# Patient Record
Sex: FEMALE | Race: WHITE | NOT HISPANIC OR LATINO | Employment: UNEMPLOYED | URBAN - NONMETROPOLITAN AREA
[De-identification: names, ages, dates, MRNs, and addresses within clinical notes are randomized per-mention and may not be internally consistent; named-entity substitution may affect disease eponyms.]

---

## 2017-08-14 ENCOUNTER — HOSPITAL ENCOUNTER (OUTPATIENT)
Facility: HOSPITAL | Age: 57
Setting detail: OBSERVATION
LOS: 1 days | Discharge: HOME | End: 2017-08-17
Attending: INTERNAL MEDICINE | Admitting: INTERNAL MEDICINE

## 2017-08-14 ENCOUNTER — PREP FOR SURGERY (OUTPATIENT)
Dept: OTHER | Facility: HOSPITAL | Age: 57
End: 2017-08-14

## 2017-08-14 DIAGNOSIS — L03.114 CELLULITIS OF ARM, LEFT: Primary | ICD-10-CM

## 2017-08-14 DIAGNOSIS — L02.419 AXILLARY ABSCESS: ICD-10-CM

## 2017-08-14 LAB
ANION GAP SERPL CALCULATED.3IONS-SCNC: 2.6 MMOL/L (ref 3.6–11.2)
BASOPHILS # BLD AUTO: 0.05 10*3/MM3 (ref 0–0.3)
BASOPHILS NFR BLD AUTO: 0.5 % (ref 0–2)
BUN BLD-MCNC: 20 MG/DL (ref 7–21)
BUN/CREAT SERPL: 35.1 (ref 7–25)
CALCIUM SPEC-SCNC: 9 MG/DL (ref 7.7–10)
CHLORIDE SERPL-SCNC: 105 MMOL/L (ref 99–112)
CO2 SERPL-SCNC: 30.4 MMOL/L (ref 24.3–31.9)
CREAT BLD-MCNC: 0.57 MG/DL (ref 0.43–1.29)
CRP SERPL-MCNC: 8.06 MG/DL (ref 0–0.99)
DEPRECATED RDW RBC AUTO: 40.7 FL (ref 37–54)
EOSINOPHIL # BLD AUTO: 0.22 10*3/MM3 (ref 0–0.7)
EOSINOPHIL NFR BLD AUTO: 2.4 % (ref 0–5)
ERYTHROCYTE [DISTWIDTH] IN BLOOD BY AUTOMATED COUNT: 13.1 % (ref 11.5–14.5)
GFR SERPL CREATININE-BSD FRML MDRD: 109 ML/MIN/1.73
GLUCOSE BLD-MCNC: 129 MG/DL (ref 70–110)
HCT VFR BLD AUTO: 36.5 % (ref 37–47)
HGB BLD-MCNC: 12.3 G/DL (ref 12–16)
IMM GRANULOCYTES # BLD: 0.02 10*3/MM3 (ref 0–0.03)
IMM GRANULOCYTES NFR BLD: 0.2 % (ref 0–0.5)
LYMPHOCYTES # BLD AUTO: 2.08 10*3/MM3 (ref 1–3)
LYMPHOCYTES NFR BLD AUTO: 22.8 % (ref 21–51)
MCH RBC QN AUTO: 29.1 PG (ref 27–33)
MCHC RBC AUTO-ENTMCNC: 33.7 G/DL (ref 33–37)
MCV RBC AUTO: 86.3 FL (ref 80–94)
MONOCYTES # BLD AUTO: 0.52 10*3/MM3 (ref 0.1–0.9)
MONOCYTES NFR BLD AUTO: 5.7 % (ref 0–10)
NEUTROPHILS # BLD AUTO: 6.22 10*3/MM3 (ref 1.4–6.5)
NEUTROPHILS NFR BLD AUTO: 68.4 % (ref 30–70)
OSMOLALITY SERPL CALC.SUM OF ELEC: 280 MOSM/KG (ref 273–305)
PLATELET # BLD AUTO: 288 10*3/MM3 (ref 130–400)
PMV BLD AUTO: 9.8 FL (ref 6–10)
POTASSIUM BLD-SCNC: 3.7 MMOL/L (ref 3.5–5.3)
RBC # BLD AUTO: 4.23 10*6/MM3 (ref 4.2–5.4)
SODIUM BLD-SCNC: 138 MMOL/L (ref 135–153)
WBC NRBC COR # BLD: 9.11 10*3/MM3 (ref 4.5–12.5)

## 2017-08-14 PROCEDURE — 25010000002 VANCOMYCIN PER 500 MG: Performed by: INTERNAL MEDICINE

## 2017-08-14 PROCEDURE — 85025 COMPLETE CBC W/AUTO DIFF WBC: CPT | Performed by: INTERNAL MEDICINE

## 2017-08-14 PROCEDURE — 86140 C-REACTIVE PROTEIN: CPT | Performed by: INTERNAL MEDICINE

## 2017-08-14 PROCEDURE — G0378 HOSPITAL OBSERVATION PER HR: HCPCS

## 2017-08-14 PROCEDURE — 94799 UNLISTED PULMONARY SVC/PX: CPT

## 2017-08-14 PROCEDURE — 80048 BASIC METABOLIC PNL TOTAL CA: CPT | Performed by: INTERNAL MEDICINE

## 2017-08-14 PROCEDURE — 99253 IP/OBS CNSLTJ NEW/EST LOW 45: CPT | Performed by: SURGERY

## 2017-08-14 RX ORDER — BISACODYL 10 MG
10 SUPPOSITORY, RECTAL RECTAL DAILY PRN
Status: CANCELLED | OUTPATIENT
Start: 2017-08-14

## 2017-08-14 RX ORDER — ACETAMINOPHEN 325 MG/1
650 TABLET ORAL EVERY 4 HOURS PRN
Status: CANCELLED | OUTPATIENT
Start: 2017-08-14

## 2017-08-14 RX ORDER — CETIRIZINE HYDROCHLORIDE 10 MG/1
10 TABLET ORAL DAILY
COMMUNITY

## 2017-08-14 RX ORDER — ACETAMINOPHEN 325 MG/1
650 TABLET ORAL EVERY 4 HOURS PRN
Status: DISCONTINUED | OUTPATIENT
Start: 2017-08-14 | End: 2017-08-17 | Stop reason: HOSPADM

## 2017-08-14 RX ORDER — SPIRONOLACTONE 25 MG/1
25 TABLET ORAL DAILY
Status: DISCONTINUED | OUTPATIENT
Start: 2017-08-15 | End: 2017-08-17 | Stop reason: HOSPADM

## 2017-08-14 RX ORDER — ONDANSETRON 4 MG/1
4 TABLET, FILM COATED ORAL EVERY 6 HOURS PRN
Status: CANCELLED | OUTPATIENT
Start: 2017-08-14

## 2017-08-14 RX ORDER — LEVOTHYROXINE SODIUM 0.1 MG/1
100 TABLET ORAL
Status: DISCONTINUED | OUTPATIENT
Start: 2017-08-15 | End: 2017-08-15 | Stop reason: SDUPTHER

## 2017-08-14 RX ORDER — ONDANSETRON 4 MG/1
4 TABLET, FILM COATED ORAL EVERY 6 HOURS PRN
Status: DISCONTINUED | OUTPATIENT
Start: 2017-08-14 | End: 2017-08-17 | Stop reason: HOSPADM

## 2017-08-14 RX ORDER — ONDANSETRON 4 MG/1
4 TABLET, ORALLY DISINTEGRATING ORAL EVERY 6 HOURS PRN
Status: CANCELLED | OUTPATIENT
Start: 2017-08-14

## 2017-08-14 RX ORDER — LOSARTAN POTASSIUM 50 MG/1
100 TABLET ORAL NIGHTLY
Status: DISCONTINUED | OUTPATIENT
Start: 2017-08-14 | End: 2017-08-17 | Stop reason: HOSPADM

## 2017-08-14 RX ORDER — SENNA AND DOCUSATE SODIUM 50; 8.6 MG/1; MG/1
2 TABLET, FILM COATED ORAL 2 TIMES DAILY PRN
Status: DISCONTINUED | OUTPATIENT
Start: 2017-08-14 | End: 2017-08-17 | Stop reason: HOSPADM

## 2017-08-14 RX ORDER — ALENDRONATE SODIUM 70 MG/1
70 TABLET ORAL
COMMUNITY
End: 2022-02-28

## 2017-08-14 RX ORDER — CITALOPRAM 20 MG/1
20 TABLET ORAL NIGHTLY
Status: DISCONTINUED | OUTPATIENT
Start: 2017-08-14 | End: 2017-08-17 | Stop reason: HOSPADM

## 2017-08-14 RX ORDER — CARBIDOPA AND LEVODOPA 25; 100 MG/1; MG/1
2 TABLET, EXTENDED RELEASE ORAL NIGHTLY
COMMUNITY

## 2017-08-14 RX ORDER — SODIUM CHLORIDE 0.9 % (FLUSH) 0.9 %
1-10 SYRINGE (ML) INJECTION AS NEEDED
Status: CANCELLED | OUTPATIENT
Start: 2017-08-14

## 2017-08-14 RX ORDER — CETIRIZINE HYDROCHLORIDE 10 MG/1
10 TABLET ORAL DAILY
Status: DISCONTINUED | OUTPATIENT
Start: 2017-08-15 | End: 2017-08-17 | Stop reason: HOSPADM

## 2017-08-14 RX ORDER — SODIUM CHLORIDE 0.9 % (FLUSH) 0.9 %
1-10 SYRINGE (ML) INJECTION AS NEEDED
Status: DISCONTINUED | OUTPATIENT
Start: 2017-08-14 | End: 2017-08-17 | Stop reason: HOSPADM

## 2017-08-14 RX ORDER — IBUPROFEN 600 MG/1
600 TABLET ORAL EVERY 6 HOURS PRN
Status: DISCONTINUED | OUTPATIENT
Start: 2017-08-14 | End: 2017-08-17 | Stop reason: HOSPADM

## 2017-08-14 RX ORDER — SENNA AND DOCUSATE SODIUM 50; 8.6 MG/1; MG/1
2 TABLET, FILM COATED ORAL 2 TIMES DAILY PRN
Status: CANCELLED | OUTPATIENT
Start: 2017-08-14

## 2017-08-14 RX ORDER — BISACODYL 5 MG/1
10 TABLET, DELAYED RELEASE ORAL DAILY PRN
Status: CANCELLED | OUTPATIENT
Start: 2017-08-14

## 2017-08-14 RX ORDER — ONDANSETRON 4 MG/1
4 TABLET, ORALLY DISINTEGRATING ORAL EVERY 6 HOURS PRN
Status: DISCONTINUED | OUTPATIENT
Start: 2017-08-14 | End: 2017-08-17 | Stop reason: HOSPADM

## 2017-08-14 RX ORDER — CYCLOBENZAPRINE HCL 10 MG
10 TABLET ORAL EVERY 8 HOURS SCHEDULED
Status: DISCONTINUED | OUTPATIENT
Start: 2017-08-14 | End: 2017-08-17 | Stop reason: HOSPADM

## 2017-08-14 RX ORDER — ONDANSETRON 2 MG/ML
4 INJECTION INTRAMUSCULAR; INTRAVENOUS EVERY 6 HOURS PRN
Status: DISCONTINUED | OUTPATIENT
Start: 2017-08-14 | End: 2017-08-17 | Stop reason: HOSPADM

## 2017-08-14 RX ORDER — HYDROCHLOROTHIAZIDE 25 MG/1
25 TABLET ORAL NIGHTLY
Status: DISCONTINUED | OUTPATIENT
Start: 2017-08-14 | End: 2017-08-17 | Stop reason: HOSPADM

## 2017-08-14 RX ORDER — ASPIRIN 81 MG/1
81 TABLET ORAL DAILY
Status: DISCONTINUED | OUTPATIENT
Start: 2017-08-15 | End: 2017-08-17 | Stop reason: HOSPADM

## 2017-08-14 RX ORDER — BUDESONIDE AND FORMOTEROL FUMARATE DIHYDRATE 160; 4.5 UG/1; UG/1
2 AEROSOL RESPIRATORY (INHALATION)
Status: DISCONTINUED | OUTPATIENT
Start: 2017-08-14 | End: 2017-08-17 | Stop reason: HOSPADM

## 2017-08-14 RX ORDER — ONDANSETRON 2 MG/ML
4 INJECTION INTRAMUSCULAR; INTRAVENOUS EVERY 6 HOURS PRN
Status: CANCELLED | OUTPATIENT
Start: 2017-08-14

## 2017-08-14 RX ORDER — BISACODYL 10 MG
10 SUPPOSITORY, RECTAL RECTAL DAILY PRN
Status: DISCONTINUED | OUTPATIENT
Start: 2017-08-14 | End: 2017-08-17 | Stop reason: HOSPADM

## 2017-08-14 RX ORDER — LOSARTAN POTASSIUM AND HYDROCHLOROTHIAZIDE 25; 100 MG/1; MG/1
1 TABLET ORAL NIGHTLY
Status: DISCONTINUED | OUTPATIENT
Start: 2017-08-14 | End: 2017-08-14 | Stop reason: CLARIF

## 2017-08-14 RX ORDER — BISACODYL 5 MG/1
10 TABLET, DELAYED RELEASE ORAL DAILY PRN
Status: DISCONTINUED | OUTPATIENT
Start: 2017-08-14 | End: 2017-08-17 | Stop reason: HOSPADM

## 2017-08-14 RX ORDER — GABAPENTIN 300 MG/1
600 CAPSULE ORAL EVERY 6 HOURS PRN
Status: DISCONTINUED | OUTPATIENT
Start: 2017-08-14 | End: 2017-08-17 | Stop reason: HOSPADM

## 2017-08-14 RX ORDER — SPIRONOLACTONE 25 MG/1
25 TABLET ORAL DAILY
COMMUNITY

## 2017-08-14 RX ADMIN — CARBIDOPA AND LEVODOPA 2 TABLET: 25; 100 TABLET ORAL at 20:34

## 2017-08-14 RX ADMIN — HYDROCHLOROTHIAZIDE 25 MG: 25 TABLET ORAL at 20:34

## 2017-08-14 RX ADMIN — CYCLOBENZAPRINE HYDROCHLORIDE 10 MG: 10 TABLET, FILM COATED ORAL at 20:34

## 2017-08-14 RX ADMIN — VANCOMYCIN HYDROCHLORIDE 1500 MG: 5 INJECTION, POWDER, LYOPHILIZED, FOR SOLUTION INTRAVENOUS at 16:41

## 2017-08-14 RX ADMIN — CITALOPRAM HYDROBROMIDE 20 MG: 20 TABLET ORAL at 20:34

## 2017-08-14 NOTE — H&P
Chief complaint  painful swollen area on the left arm     Subjective     Patient is a 57 y.o. female presents with painful swollen area on the left arm for about 5 days.  Symptom is started as redness of the skin on the medial and lower surface of the left arm, progressively area size has increased and she has developed a softer painfull swelling in the middle of that area.  Patient has previous history of MRSA infection on her groin for which she was hospitalized received IV antibiotic and underwent incision and drainage in the remote past.  Today when she was examined in the office her history and examination findings were very much concerning for cellulitis and abscess which was needing incision and drainage and IV antibiotic and patient is admitted to the hospital for further care.      History  Past Medical History:   Diagnosis Date   • Arthritis    • Cancer     melanoma   • Coronary artery disease    • Hypertension    • Hypothyroid    • Sleep apnea      Past Surgical History:   Procedure Laterality Date   • ESSURE TUBAL LIGATION     • INCISION AND DRAINAGE ABSCESS       Family History   Problem Relation Age of Onset   • Hypertension Father    • Cancer Father    • Hypertension Brother    • Cancer Brother      Social History   Substance Use Topics   • Smoking status: Current Every Day Smoker     Packs/day: 1.00     Years: 47.00   • Smokeless tobacco: None   • Alcohol use No     Prescriptions Prior to Admission   Medication Sig Dispense Refill Last Dose   • alendronate (FOSAMAX) 70 MG tablet Take 70 mg by mouth Every 7 (Seven) Days. friday 08/11/2017   • ASPIR-LOW 81 MG EC tablet Take 81 mg by mouth daily.   8/14/2017 at 0900   • carbidopa-levodopa ER (SINEMET CR)  MG per tablet Take 2 tablets by mouth Every Night.   8/13/2017 at 2100   • cetirizine (zyrTEC) 10 MG tablet Take 10 mg by mouth Daily.   8/14/2017 at 0900   • citalopram (CeleXA) 20 MG tablet Take 20 mg by mouth Every Evening.   8/13/2017  at Unknown time   • cyclobenzaprine (FLEXERIL) 10 MG tablet Take 10 mg by mouth 3 (Three) Times a Day.   8/14/2017 at 0900   • DULERA 100-5 MCG/ACT inhaler Inhale 2 puffs 2 (Two) Times a Day.   8/14/2017 at 0900   • gabapentin (NEURONTIN) 600 MG tablet Take 600 mg by mouth 4 (Four) Times a Day As Needed (for moderate pain).   8/14/2017 at 1500   • ibuprofen (ADVIL,MOTRIN) 600 MG tablet Take 600 tablets by mouth 4 (Four) Times a Day As Needed for Mild Pain (1-3).   8/14/2017 at 1300   • levothyroxine (SYNTHROID, LEVOTHROID) 100 MCG tablet Take 100 mcg by mouth daily.   8/14/2017 at 0900   • losartan-hydrochlorothiazide (HYZAAR) 100-25 MG per tablet Take 1 tablet by mouth Every Night.   8/13/2017 at 2100    • spironolactone (ALDACTONE) 25 MG tablet Take 25 mg by mouth Daily.   8/14/2017 at 0900     Allergies:  Review of patient's allergies indicates no known allergies.       Review of Systems:   Review of Systems - General ROS: negative for - fever, malaise, weight gain or weight loss  Psychological ROS: negative for - anxiety, disorientation or hallucinations  Ophthalmic ROS: negative for - blurry vision or double vision  ENT ROS: negative for - hearing change or vertigo  Allergy and Immunology ROS: negative for - hives, insect bite sensitivity or immunodeficiency  Hematological and Lymphatic ROS: negative for - bruising or swollen lymph nodes  Endocrine ROS: negative for - galactorrhea or hair pattern changes, no polyuria or polydipsia.  Respiratory ROS: negative for - hemoptysis or sputum changes, cough or wheezing  Cardiovascular ROS: negative for - loss of consciousness, chest pain or palpitation  Gastrointestinal ROS: negative for - melena or stool incontinence, nausea vomiting , diarrhea or abdominal pain  Genito-Urinary ROS: negative for - genital ulcers or pelvic pain or dysuria  Musculoskeletal ROS: negative for - gait disturbance or muscular weakness  Neurological ROS: negative for - bowel and bladder  control changes or seizures, no focal weakness  Dermatological ROS: Positive for redness, pain and swelling of the left arm.          Physical Exam:    Vital Signs  Temp:  [97.7 °F (36.5 °C)] 97.7 °F (36.5 °C)  Heart Rate:  [86] 86  Resp:  [20] 20  BP: (129)/(77) 129/77     General Appearance:    Alert, cooperative, in no acute distress   Head:  Normocephalic, without obvious abnormality, atraumatic   Eyes:          Lids and lashes normal, conjunctivae and sclerae normal, no icterus, no pallor, corneas clear, PERRLA   Ears:  Ears appear intact with no abnormalities noted   Throat: No oral lesions, no thrush, oral mucosa moist   Neck: No adenopathy, supple, trachea midline, no thyromegaly, no carotid bruit, no JVD   Back:   no skin lesions, no erythema or scars, no tenderness to percussion or palpation.     Lungs:   Clear to auscultation,respirations regular, even and               Unlabored, no wheezing     Heart:  Regular rhythm and normal rate, normal S1 and S2, no        murmur, no gallop, no rub, no click   Abdomen:   Normal bowel sounds, no masses, no organomegaly, soft   non-tender, non-distended, no guarding, no rebound   tenderness   Extremities: Moves all extremities well, Left arm has redness, warmth, tenderness with central area of fluctuant swelling .   Pulses: Pulses palpable and equal bilaterally   Skin: No bleeding, bruising or rash   Lymph nodes: No palpable adenopathy   Neurologic: Cranial nerves 2 - 12 grossly intact, sensation intact, overall normal motor strength, DTR present and equal bilaterally             Labs:  Lab Results (last 24 hours)     Procedure Component Value Units Date/Time    C-reactive Protein [[id number]]  (Abnormal) Collected:  08/14/17 1717    Specimen:  Blood Updated:  08/14/17 1819     C-Reactive Protein 8.06 (H) mg/dL     Basic Metabolic Panel [[id number]]  (Abnormal) Collected:  08/14/17 1717    Specimen:  Blood Updated:  08/14/17 1819     Glucose 129 (H) mg/dL      BUN 20  mg/dL      Creatinine 0.57 mg/dL      Sodium 138 mmol/L      Potassium 3.7 mmol/L      Chloride 105 mmol/L      CO2 30.4 mmol/L      Calcium 9.0 mg/dL      eGFR Non African Amer 109 mL/min/1.73      BUN/Creatinine Ratio 35.1 (H)     Anion Gap 2.6 (L) mmol/L     Narrative:       GFR Normal >60  Chronic Kidney Disease <60  Kidney Failure <15    Osmolality, Calculated [[id number]]  (Normal) Collected:  08/14/17 1717    Specimen:  Blood Updated:  08/14/17 1819     Osmolality Calc 280.0 mOsm/kg           Images:  Imaging Results (last 24 hours)     ** No results found for the last 24 hours. **             Assessment/Plan     Active Problems:    Cellulitis and abscess of the left arm     Possible MRSA infection        Patient is admitted on the medical floor.  I have empirically started the patient on IV vancomycin.  Surgical consult is requested for incision and drainage.      Candace Hayden MD  08/14/17  6:57 PM

## 2017-08-14 NOTE — CONSULTS
Consultation note    Referring physician: Candace Hayden MD    Consulting Physician: Dr. Ramakrishna Waldrop MD    Reason for consultation: abscess left arm    No chief complaint on file.      HPI:   The patient is a very pleasant 57 y.o. years old female admitted to the hospital by Dr. Woodward for an abscess or left arm.  This is been present for a few days.  She's had to have an abscess drain in the right axilla in the past.  No other modifying factors or associated symptoms    Past Medical History:   Diagnosis Date   • Arthritis    • Cancer     melanoma   • Coronary artery disease    • Hypertension    • Hypothyroid    • Sleep apnea        Past Surgical History:   Procedure Laterality Date   • ESSURE TUBAL LIGATION     • INCISION AND DRAINAGE ABSCESS           Current Facility-Administered Medications:   •  acetaminophen (TYLENOL) tablet 650 mg, 650 mg, Oral, Q4H PRN, Candace Hayden MD  •  bisacodyl (DULCOLAX) EC tablet 10 mg, 10 mg, Oral, Daily PRN, Candace Hayden MD  •  bisacodyl (DULCOLAX) suppository 10 mg, 10 mg, Rectal, Daily PRN, Candace Hayden MD  •  enoxaparin (LOVENOX) syringe 40 mg, 40 mg, Subcutaneous, Nightly, Candace Hayden MD  •  magnesium hydroxide (MILK OF MAGNESIA) suspension 2400 mg/10mL 10 mL, 10 mL, Oral, Daily PRN, Candace Hayden MD  •  ondansetron (ZOFRAN) tablet 4 mg, 4 mg, Oral, Q6H PRN **OR** ondansetron ODT (ZOFRAN-ODT) disintegrating tablet 4 mg, 4 mg, Oral, Q6H PRN **OR** ondansetron (ZOFRAN) injection 4 mg, 4 mg, Intravenous, Q6H PRN, Candace Hayden MD  •  pneumococcal polysaccharide 23-valent (PNEUMOVAX-23) vaccine 0.5 mL, 0.5 mL, Intramuscular, During Hospitalization, Candace Hayden MD  •  sennosides-docusate sodium (SENOKOT-S) 8.6-50 MG tablet 2 tablet, 2 tablet, Oral, BID PRN, Candace Hayden MD  •  sodium chloride 0.9 % flush 1-10 mL, 1-10 mL, Intravenous, PRN, Candace Hayden MD  •  vancomycin (VANCOCIN) 1,500 mg in sodium chloride 0.9 % 500 mL IVPB, 1,500 mg, Intravenous, Once,  Candace Hayden MD    No Known Allergies    Social History     Social History   • Marital status:      Spouse name: N/A   • Number of children: N/A   • Years of education: N/A     Occupational History   • Not on file.     Social History Main Topics   • Smoking status: Current Every Day Smoker     Packs/day: 1.00     Years: 47.00   • Smokeless tobacco: Not on file   • Alcohol use No   • Drug use: No   • Sexual activity: Not on file     Other Topics Concern   • Not on file     Social History Narrative       Family History   Problem Relation Age of Onset   • Hypertension Father    • Cancer Father    • Hypertension Brother    • Cancer Brother        ROS:   Constitutional: denies any weight changes, fatigue or weakness.  Eyes: : denies blurred or double vision  Cardiovascular: denies chest pain, palpitations, edemas.  Respiratory: denies cough, sputum, SOB.  Gastrointestinal: denies N&V, abd pain, diarrhea, constipation.  Genitourinary: denies dysuria, frequency.  Endocrine: denies cold intolerance, lethargy and flushing.  Hem: denies excessive bruising and postop bleeding.  Musculoskeletal: denies weakness, joint swelling, pain or stiffness.  Neuro: denies seizures, CVA, paresthesia, or peripheral neuropathy.   Skin: denies change in nevi, rashes, masses.        Physical Exam:       General Appearance:    Alert, cooperative, in no acute distress   Head:    Normocephalic, without obvious abnormality, atraumatic   Eyes:            Lids and lashes normal, conjunctivae and sclerae normal, no   icterus, no pallor, corneas clear, PERRLA   Ears:    Ears appear intact with no abnormalities noted   Throat:   No oral lesions, no thrush, oral mucosa moist   Neck:   No adenopathy, supple, trachea midline, no thyromegaly, no   carotid bruit, no JVD   Back:     No kyphosis present, no scoliosis present, no skin lesions,      erythema or scars, no tenderness to percussion or                   palpation,   range of motion normal    Lungs:     Clear to auscultation,respirations regular, even and                  unlabored    Heart:    Regular rhythm and normal rate, normal S1 and S2, no            murmur, no gallop, no rub, no click   Chest Wall:    No abnormalities observed   Abdomen:    Soft nontender    Rectal:     Deferred   Extremities:   Moves all extremities well, no edema, no cyanosis, no             redness   Pulses:   Pulses palpable and equal bilaterally   Skin:   Left upper arm just distal to the axilla there is a 3 cm abscess with surrounding cellulitis    Lymph nodes:   No palpable adenopathy   Neurologic:   Cranial nerves 2 - 12 grossly intact, sensation intact, DTR       present and equal bilaterally     Vitals:    08/14/17 1456   BP: 129/77   Pulse: 86   Resp: 20   Temp: 97.7 °F (36.5 °C)   SpO2: 93%       Lab Results   Component Value Date    GLUCOSE 134 (H) 05/19/2016    BUN 14 05/19/2016    CREATININE 0.62 05/19/2016    BCR 21.8 05/19/2016    CO2 21.4 (L) 05/19/2016    CALCIUM 8.5 05/19/2016    ALBUMIN 4.6 01/23/2015    LABIL2 1.5 01/23/2015    AST 18 01/23/2015    ALT 16 01/23/2015     No results found for: WBC, RBC, HGB, HCT, MCV, MCH, MCHC, RDW, RDWSD, MPV, PLT, NEUTRORELPCT, LYMPHORELPCT, MONORELPCT, EOSRELPCT, BASORELPCT, AUTOIGPER, NEUTROABS, LYMPHSABS, MONOSABS, EOSABS, BASOSABS, AUTOIGNUM, NRBC    Imaging Results (last 72 hours)     ** No results found for the last 72 hours. **            Assessment:   Abscess left arm    Plan:  Incision and drainage in the operating room.  Unfortunately the patient has are today so we'll have to do it tomorrow morning.      Dragon disclaimer:  Much of this encounter note is an electronic transcription/translation of spoken language to printed text. The electronic translation of spoken language may permit erroneous, or at times, nonsensical words or phrases to be inadvertently transcribed; Although I have reviewed the note for such errors, some may still exist.

## 2017-08-14 NOTE — PLAN OF CARE
Problem: Patient Care Overview (Adult)  Goal: Plan of Care Review  Outcome: Ongoing (interventions implemented as appropriate)    Problem: Infection, Risk/Actual (Adult)  Goal: Identify Related Risk Factors and Signs and Symptoms  Outcome: Ongoing (interventions implemented as appropriate)  Goal: Infection Prevention/Resolution  Outcome: Ongoing (interventions implemented as appropriate)    Problem: Skin Integrity Impairment, Risk/Actual (Adult)  Goal: Identify Related Risk Factors and Signs and Symptoms  Outcome: Ongoing (interventions implemented as appropriate)  Goal: Skin Integrity/Wound Healing  Outcome: Ongoing (interventions implemented as appropriate)    Problem: Pain, Acute (Adult)  Goal: Identify Related Risk Factors and Signs and Symptoms  Outcome: Ongoing (interventions implemented as appropriate)  Goal: Acceptable Pain Control/Comfort Level  Outcome: Ongoing (interventions implemented as appropriate)    Problem: Cellulitis (Adult)  Goal: Signs and Symptoms of Listed Potential Problems Will be Absent or Manageable (Cellulitis)  Outcome: Ongoing (interventions implemented as appropriate)

## 2017-08-14 NOTE — PROGRESS NOTES
Smoking Cessation  Pharmacy was consulted to educate the patient on Smoking Cessation.  The pharmacy Intern, Arabella Flynn, spoke with MsJuan M Alex. She was receptive to learning about ways to stop smoking and the advantages of smoking cessation.  Provided the patient with written information to help answer any further questions not discussed in meeting.    Yessica Cordoba [facility]  8/14/2017  3:11 PM

## 2017-08-15 ENCOUNTER — ANESTHESIA (OUTPATIENT)
Dept: PERIOP | Facility: HOSPITAL | Age: 57
End: 2017-08-15

## 2017-08-15 ENCOUNTER — ANESTHESIA EVENT (OUTPATIENT)
Dept: PERIOP | Facility: HOSPITAL | Age: 57
End: 2017-08-15

## 2017-08-15 PROCEDURE — 25010000002 VANCOMYCIN PER 500 MG

## 2017-08-15 PROCEDURE — G0378 HOSPITAL OBSERVATION PER HR: HCPCS

## 2017-08-15 PROCEDURE — 87205 SMEAR GRAM STAIN: CPT | Performed by: SURGERY

## 2017-08-15 PROCEDURE — 25010000002 FENTANYL CITRATE (PF) 100 MCG/2ML SOLUTION: Performed by: NURSE ANESTHETIST, CERTIFIED REGISTERED

## 2017-08-15 PROCEDURE — 25010000002 KETOROLAC TROMETHAMINE PER 15 MG: Performed by: NURSE ANESTHETIST, CERTIFIED REGISTERED

## 2017-08-15 PROCEDURE — 87186 SC STD MICRODIL/AGAR DIL: CPT | Performed by: SURGERY

## 2017-08-15 PROCEDURE — 94760 N-INVAS EAR/PLS OXIMETRY 1: CPT

## 2017-08-15 PROCEDURE — 87077 CULTURE AEROBIC IDENTIFY: CPT | Performed by: SURGERY

## 2017-08-15 PROCEDURE — 87070 CULTURE OTHR SPECIMN AEROBIC: CPT | Performed by: SURGERY

## 2017-08-15 PROCEDURE — 25010000002 ONDANSETRON PER 1 MG: Performed by: INTERNAL MEDICINE

## 2017-08-15 PROCEDURE — 25010000002 MIDAZOLAM PER 1 MG: Performed by: NURSE ANESTHETIST, CERTIFIED REGISTERED

## 2017-08-15 PROCEDURE — 94799 UNLISTED PULMONARY SVC/PX: CPT

## 2017-08-15 PROCEDURE — 87147 CULTURE TYPE IMMUNOLOGIC: CPT | Performed by: SURGERY

## 2017-08-15 PROCEDURE — 10061 I&D ABSCESS COMP/MULTIPLE: CPT | Performed by: SURGERY

## 2017-08-15 RX ORDER — OXYCODONE AND ACETAMINOPHEN 10; 325 MG/1; MG/1
1 TABLET ORAL EVERY 4 HOURS PRN
Status: DISCONTINUED | OUTPATIENT
Start: 2017-08-15 | End: 2017-08-17 | Stop reason: HOSPADM

## 2017-08-15 RX ORDER — MIDAZOLAM HYDROCHLORIDE 1 MG/ML
INJECTION INTRAMUSCULAR; INTRAVENOUS AS NEEDED
Status: DISCONTINUED | OUTPATIENT
Start: 2017-08-15 | End: 2017-08-15 | Stop reason: SURG

## 2017-08-15 RX ORDER — FAMOTIDINE 10 MG/ML
INJECTION, SOLUTION INTRAVENOUS AS NEEDED
Status: DISCONTINUED | OUTPATIENT
Start: 2017-08-15 | End: 2017-08-15 | Stop reason: SURG

## 2017-08-15 RX ORDER — ONDANSETRON 4 MG/1
4 TABLET, ORALLY DISINTEGRATING ORAL EVERY 6 HOURS PRN
Status: DISCONTINUED | OUTPATIENT
Start: 2017-08-15 | End: 2017-08-17 | Stop reason: HOSPADM

## 2017-08-15 RX ORDER — FENTANYL CITRATE 50 UG/ML
INJECTION, SOLUTION INTRAMUSCULAR; INTRAVENOUS AS NEEDED
Status: DISCONTINUED | OUTPATIENT
Start: 2017-08-15 | End: 2017-08-15 | Stop reason: SURG

## 2017-08-15 RX ORDER — MAGNESIUM HYDROXIDE 1200 MG/15ML
LIQUID ORAL AS NEEDED
Status: DISCONTINUED | OUTPATIENT
Start: 2017-08-15 | End: 2017-08-15 | Stop reason: HOSPADM

## 2017-08-15 RX ORDER — FENTANYL CITRATE 50 UG/ML
50 INJECTION, SOLUTION INTRAMUSCULAR; INTRAVENOUS
Status: DISCONTINUED | OUTPATIENT
Start: 2017-08-15 | End: 2017-08-15 | Stop reason: HOSPADM

## 2017-08-15 RX ORDER — MIDAZOLAM HYDROCHLORIDE 1 MG/ML
1 INJECTION INTRAMUSCULAR; INTRAVENOUS
Status: DISCONTINUED | OUTPATIENT
Start: 2017-08-15 | End: 2017-08-15 | Stop reason: HOSPADM

## 2017-08-15 RX ORDER — KETOROLAC TROMETHAMINE 30 MG/ML
INJECTION, SOLUTION INTRAMUSCULAR; INTRAVENOUS AS NEEDED
Status: DISCONTINUED | OUTPATIENT
Start: 2017-08-15 | End: 2017-08-15 | Stop reason: SURG

## 2017-08-15 RX ORDER — NALOXONE HCL 0.4 MG/ML
0.4 VIAL (ML) INJECTION
Status: DISCONTINUED | OUTPATIENT
Start: 2017-08-15 | End: 2017-08-17 | Stop reason: HOSPADM

## 2017-08-15 RX ORDER — LEVOTHYROXINE SODIUM 0.1 MG/1
100 TABLET ORAL
Status: DISCONTINUED | OUTPATIENT
Start: 2017-08-16 | End: 2017-08-17 | Stop reason: HOSPADM

## 2017-08-15 RX ORDER — IPRATROPIUM BROMIDE AND ALBUTEROL SULFATE 2.5; .5 MG/3ML; MG/3ML
3 SOLUTION RESPIRATORY (INHALATION) ONCE AS NEEDED
Status: DISCONTINUED | OUTPATIENT
Start: 2017-08-15 | End: 2017-08-15 | Stop reason: HOSPADM

## 2017-08-15 RX ORDER — OXYCODONE HYDROCHLORIDE AND ACETAMINOPHEN 5; 325 MG/1; MG/1
1 TABLET ORAL ONCE AS NEEDED
Status: DISCONTINUED | OUTPATIENT
Start: 2017-08-15 | End: 2017-08-15 | Stop reason: HOSPADM

## 2017-08-15 RX ORDER — MEPERIDINE HYDROCHLORIDE 25 MG/ML
12.5 INJECTION INTRAMUSCULAR; INTRAVENOUS; SUBCUTANEOUS
Status: DISCONTINUED | OUTPATIENT
Start: 2017-08-15 | End: 2017-08-15 | Stop reason: HOSPADM

## 2017-08-15 RX ORDER — ONDANSETRON 4 MG/1
4 TABLET, FILM COATED ORAL EVERY 6 HOURS PRN
Status: DISCONTINUED | OUTPATIENT
Start: 2017-08-15 | End: 2017-08-17 | Stop reason: HOSPADM

## 2017-08-15 RX ORDER — SODIUM CHLORIDE, SODIUM LACTATE, POTASSIUM CHLORIDE, CALCIUM CHLORIDE 600; 310; 30; 20 MG/100ML; MG/100ML; MG/100ML; MG/100ML
125 INJECTION, SOLUTION INTRAVENOUS CONTINUOUS
Status: DISCONTINUED | OUTPATIENT
Start: 2017-08-15 | End: 2017-08-17

## 2017-08-15 RX ORDER — ONDANSETRON 2 MG/ML
4 INJECTION INTRAMUSCULAR; INTRAVENOUS EVERY 6 HOURS PRN
Status: DISCONTINUED | OUTPATIENT
Start: 2017-08-15 | End: 2017-08-17 | Stop reason: HOSPADM

## 2017-08-15 RX ORDER — ONDANSETRON 2 MG/ML
4 INJECTION INTRAMUSCULAR; INTRAVENOUS ONCE AS NEEDED
Status: DISCONTINUED | OUTPATIENT
Start: 2017-08-15 | End: 2017-08-15 | Stop reason: HOSPADM

## 2017-08-15 RX ORDER — BUPIVACAINE HYDROCHLORIDE AND EPINEPHRINE 2.5; 5 MG/ML; UG/ML
INJECTION, SOLUTION EPIDURAL; INFILTRATION; INTRACAUDAL; PERINEURAL AS NEEDED
Status: DISCONTINUED | OUTPATIENT
Start: 2017-08-15 | End: 2017-08-15 | Stop reason: HOSPADM

## 2017-08-15 RX ORDER — SODIUM CHLORIDE 0.9 % (FLUSH) 0.9 %
1-10 SYRINGE (ML) INJECTION AS NEEDED
Status: DISCONTINUED | OUTPATIENT
Start: 2017-08-15 | End: 2017-08-15 | Stop reason: HOSPADM

## 2017-08-15 RX ORDER — MIDAZOLAM HYDROCHLORIDE 1 MG/ML
2 INJECTION INTRAMUSCULAR; INTRAVENOUS
Status: DISCONTINUED | OUTPATIENT
Start: 2017-08-15 | End: 2017-08-15 | Stop reason: HOSPADM

## 2017-08-15 RX ADMIN — VANCOMYCIN HYDROCHLORIDE 1250 MG: 5 INJECTION, POWDER, LYOPHILIZED, FOR SOLUTION INTRAVENOUS at 17:03

## 2017-08-15 RX ADMIN — LOSARTAN POTASSIUM 100 MG: 50 TABLET, FILM COATED ORAL at 20:18

## 2017-08-15 RX ADMIN — FENTANYL CITRATE 100 MCG: 50 INJECTION INTRAMUSCULAR; INTRAVENOUS at 14:46

## 2017-08-15 RX ADMIN — KETOROLAC TROMETHAMINE 30 MG: 30 INJECTION, SOLUTION INTRAMUSCULAR; INTRAVENOUS at 14:55

## 2017-08-15 RX ADMIN — GABAPENTIN 600 MG: 300 CAPSULE ORAL at 12:41

## 2017-08-15 RX ADMIN — CYCLOBENZAPRINE HYDROCHLORIDE 10 MG: 10 TABLET, FILM COATED ORAL at 22:00

## 2017-08-15 RX ADMIN — FENTANYL CITRATE 100 MCG: 50 INJECTION INTRAMUSCULAR; INTRAVENOUS at 14:40

## 2017-08-15 RX ADMIN — MIDAZOLAM HYDROCHLORIDE 2 MG: 1 INJECTION, SOLUTION INTRAMUSCULAR; INTRAVENOUS at 14:44

## 2017-08-15 RX ADMIN — BUDESONIDE AND FORMOTEROL FUMARATE DIHYDRATE 2 PUFF: 160; 4.5 AEROSOL RESPIRATORY (INHALATION) at 07:54

## 2017-08-15 RX ADMIN — VANCOMYCIN HYDROCHLORIDE 1250 MG: 5 INJECTION, POWDER, LYOPHILIZED, FOR SOLUTION INTRAVENOUS at 04:11

## 2017-08-15 RX ADMIN — CITALOPRAM HYDROBROMIDE 20 MG: 20 TABLET ORAL at 20:19

## 2017-08-15 RX ADMIN — HYDROCHLOROTHIAZIDE 25 MG: 25 TABLET ORAL at 20:19

## 2017-08-15 RX ADMIN — ONDANSETRON 4 MG: 2 INJECTION INTRAMUSCULAR; INTRAVENOUS at 14:55

## 2017-08-15 RX ADMIN — SODIUM CHLORIDE, POTASSIUM CHLORIDE, SODIUM LACTATE AND CALCIUM CHLORIDE: 600; 310; 30; 20 INJECTION, SOLUTION INTRAVENOUS at 14:40

## 2017-08-15 RX ADMIN — BUDESONIDE AND FORMOTEROL FUMARATE DIHYDRATE 2 PUFF: 160; 4.5 AEROSOL RESPIRATORY (INHALATION) at 19:34

## 2017-08-15 RX ADMIN — FAMOTIDINE 20 MG: 10 INJECTION, SOLUTION INTRAVENOUS at 14:55

## 2017-08-15 RX ADMIN — CARBIDOPA AND LEVODOPA 2 TABLET: 25; 100 TABLET ORAL at 20:19

## 2017-08-15 NOTE — PLAN OF CARE
Problem: Patient Care Overview (Adult)  Goal: Plan of Care Review  Outcome: Ongoing (interventions implemented as appropriate)  Goal: Adult Individualization and Mutuality  Outcome: Ongoing (interventions implemented as appropriate)  Goal: Discharge Needs Assessment  Outcome: Ongoing (interventions implemented as appropriate)    Problem: Infection, Risk/Actual (Adult)  Goal: Identify Related Risk Factors and Signs and Symptoms  Outcome: Ongoing (interventions implemented as appropriate)  Goal: Infection Prevention/Resolution  Outcome: Ongoing (interventions implemented as appropriate)    Problem: Skin Integrity Impairment, Risk/Actual (Adult)  Goal: Identify Related Risk Factors and Signs and Symptoms  Outcome: Ongoing (interventions implemented as appropriate)  Goal: Skin Integrity/Wound Healing  Outcome: Ongoing (interventions implemented as appropriate)    Problem: Pain, Acute (Adult)  Goal: Identify Related Risk Factors and Signs and Symptoms  Outcome: Ongoing (interventions implemented as appropriate)  Goal: Acceptable Pain Control/Comfort Level  Outcome: Ongoing (interventions implemented as appropriate)    Problem: Cellulitis (Adult)  Goal: Signs and Symptoms of Listed Potential Problems Will be Absent or Manageable (Cellulitis)  Outcome: Ongoing (interventions implemented as appropriate)

## 2017-08-15 NOTE — PROGRESS NOTES
LOS: 1 day   Patient Care Team:  Candace Hayden MD as PCP - General  Candace Hayden MD as PCP - Family Medicine    Brief history of present illness:   Patient is a 57 y.o. female presents with painful swollen area on the left arm for about 5 days.  Symptom is started as redness of the skin on the medial and lower surface of the left arm, progressively area size has increased and she has developed a softer painfull swelling in the middle of that area.  Patient has previous history of MRSA infection on her groin for which she was hospitalized received IV antibiotic and underwent incision and drainage in the remote past.  Today when she was examined in the office her history and examination findings were very much concerning for cellulitis and abscess which was needing incision and drainage and IV antibiotic and patient is admitted to the hospital for further care.         Subjective   Left arm pain and swelling.    History taken from: patient and chart    Interval History:     No significant changes to patients past history, family history, and social history.     Review of Systems:   Review of Systems - General ROS: negative for - fever, malaise, weight gain or weight loss  Psychological ROS: negative for - anxiety, disorientation or hallucinations  Ophthalmic ROS: negative for - blurry vision or double vision  ENT ROS: negative for - hearing change or vertigo  Allergy and Immunology ROS: negative for - hives, insect bite sensitivity or immunodeficiency  Hematological and Lymphatic ROS: negative for - bruising or swollen lymph nodes  Endocrine ROS: negative for - galactorrhea or hair pattern changes, no polyuria or polydipsia.  Respiratory ROS: negative for - hemoptysis or sputum changes, cough or wheezing  Cardiovascular ROS: negative for - loss of consciousness, chest pain or palpitation  Gastrointestinal ROS: negative for - melena or stool incontinence, nausea vomiting , diarrhea or abdominal  pain  Genito-Urinary ROS: negative for - genital ulcers or pelvic pain or dysuria  Musculoskeletal ROS: negative for - gait disturbance or muscular weakness  Neurological ROS: negative for - bowel and bladder control changes or seizures, no focal weakness  Dermatological ROS: Positive for redness, pain and swelling of the left arm.         Physical Exam:    Vital Signs  Temp:  [97.7 °F (36.5 °C)-97.9 °F (36.6 °C)] 97.9 °F (36.6 °C)  Heart Rate:  [85-86] 85  Resp:  [18-20] 18  BP: (119-129)/(69-77) 119/69   General Appearance:     Alert, cooperative, in no acute distress   Head:  Normocephalic, without obvious abnormality, atraumatic   Eyes:          Lids and lashes normal, conjunctivae and sclerae normal, no icterus, no pallor, corneas clear, PERRLA   Ears:  Ears appear intact with no abnormalities noted   Throat: No oral lesions, no thrush, oral mucosa moist   Neck: No adenopathy, supple, trachea midline, no thyromegaly, no carotid bruit, no JVD   Back:   no skin lesions, no erythema or scars, no tenderness to percussion or palpation.     Lungs:   Clear to auscultation,respirations regular, even and               Unlabored, no wheezing     Heart:  Regular rhythm and normal rate, normal S1 and S2, no        murmur, no gallop, no rub, no click   Abdomen:   Normal bowel sounds, no masses, no organomegaly, soft   non-tender, non-distended, no guarding, no rebound   tenderness   Extremities: Moves all extremities well, Left arm has redness, warmth, tenderness with central area of fluctuant swelling .   Pulses: Pulses palpable and equal bilaterally   Skin: No bleeding, bruising or rash   Lymph nodes: No palpable adenopathy   Neurologic: Cranial nerves 2 - 12 grossly intact, sensation intact, overall normal motor strength, DTR present and equal bilaterally        Labs:    Results from last 7 days  Lab Units 08/14/17  1917 08/14/17  1717   CRP mg/dL  --  8.06*   WBC 10*3/mm3 9.11  --    HEMOGLOBIN g/dL 12.3  --     HEMATOCRIT % 36.5*  --    MCV fL 86.3  --    MCHC g/dL 33.7  --    PLATELETS 10*3/mm3 288  --            Results from last 7 days  Lab Units 08/14/17  1717   SODIUM mmol/L 138   POTASSIUM mmol/L 3.7   CHLORIDE mmol/L 105   CO2 mmol/L 30.4   BUN mg/dL 20   CREATININE mg/dL 0.57   EGFR IF NONAFRICN AM mL/min/1.73 109   CALCIUM mg/dL 9.0   GLUCOSE mg/dL 129*   Estimated Creatinine Clearance: 99.9 mL/min (by C-G formula based on Cr of 0.57).  No results found for: AMMONIA  Lab Results   Component Value Date    CKMB <0.2 12/26/2014         No results found for: HGBA1C  Lab Results   Component Value Date    TSH 0.551 01/23/2015     No results found for: PREGTESTUR, PREGSERUM, HCG, HCGQUANT    Pain Management Panel     There is no flowsheet data to display.                Cultures:  @LASTLAB3(BLOODCX:*,URINECX:*,WOUNDCX:*,MRSACX:*,RESPCX:*,STOOLCX:*)@    Lab Results   Component Value Date    CKMB <0.2 12/26/2014             Images:  Imaging Results (last 24 hours)     ** No results found for the last 24 hours. **           Active Meds      Current Facility-Administered Medications:   •  acetaminophen (TYLENOL) tablet 650 mg, 650 mg, Oral, Q4H PRN, Candace Hayden MD  •  aspirin EC tablet 81 mg, 81 mg, Oral, Daily, Candace Hayden MD  •  bisacodyl (DULCOLAX) EC tablet 10 mg, 10 mg, Oral, Daily PRN, Candace Hayden MD  •  bisacodyl (DULCOLAX) suppository 10 mg, 10 mg, Rectal, Daily PRN, Candace Hayden MD  •  budesonide-formoterol (SYMBICORT) 160-4.5 MCG/ACT inhaler 2 puff, 2 puff, Inhalation, BID - RT, Candace Hayden MD  •  carbidopa-levodopa (SINEMET)  MG per tablet 2 tablet, 2 tablet, Oral, Nightly, Candace Hayden MD, 2 tablet at 08/14/17 2034  •  cetirizine (zyrTEC) tablet 10 mg, 10 mg, Oral, Daily, Candace Hayden MD  •  citalopram (CeleXA) tablet 20 mg, 20 mg, Oral, Nightly, Candace Hayden MD, 20 mg at 08/14/17 2034  •  cyclobenzaprine (FLEXERIL) tablet 10 mg, 10 mg, Oral, Q8H, Candace Hayden MD, 10 mg at  08/14/17 2034  •  enoxaparin (LOVENOX) syringe 40 mg, 40 mg, Subcutaneous, Nightly, Candace Hayden MD  •  gabapentin (NEURONTIN) capsule 600 mg, 600 mg, Oral, Q6H PRN, Candace Hayden MD  •  hydrochlorothiazide (HYDRODIURIL) tablet 25 mg, 25 mg, Oral, Nightly, Shantel Coto, [facility], 25 mg at 08/14/17 2034  •  ibuprofen (ADVIL,MOTRIN) tablet 600 mg, 600 mg, Oral, Q6H PRN, Candace Hayden MD  •  levothyroxine (SYNTHROID, LEVOTHROID) tablet 100 mcg, 100 mcg, Oral, Q AM, Candace Hayden MD  •  losartan (COZAAR) tablet 100 mg, 100 mg, Oral, Nightly, Shantel Coto, [facility]  •  magnesium hydroxide (MILK OF MAGNESIA) suspension 2400 mg/10mL 10 mL, 10 mL, Oral, Daily PRN, Candace Hayden MD  •  ondansetron (ZOFRAN) tablet 4 mg, 4 mg, Oral, Q6H PRN **OR** ondansetron ODT (ZOFRAN-ODT) disintegrating tablet 4 mg, 4 mg, Oral, Q6H PRN **OR** ondansetron (ZOFRAN) injection 4 mg, 4 mg, Intravenous, Q6H PRN, Candace Hayden MD  •  pneumococcal polysaccharide 23-valent (PNEUMOVAX-23) vaccine 0.5 mL, 0.5 mL, Intramuscular, During Hospitalization, Candace Hayden MD  •  sennosides-docusate sodium (SENOKOT-S) 8.6-50 MG tablet 2 tablet, 2 tablet, Oral, BID PRN, Candace Hayden MD  •  sodium chloride 0.9 % flush 1-10 mL, 1-10 mL, Intravenous, PRN, Candace Hayden MD  •  spironolactone (ALDACTONE) tablet 25 mg, 25 mg, Oral, Daily, Candace Hayden MD  •  vancomycin (VANCOCIN) 1,250 mg in sodium chloride 0.9 % 250 mL IVPB, 1,250 mg, Intravenous, Q12H, Rolf Schuler, [facility], 1,250 mg at 08/15/17 0411  •  Vancomycin Pharmacy Intermittent Dosing, , Does not apply, Daily, Candace Hayden MD    Assessment/Plan     Active Problems:    Cellulitis and abscess of the left arm     Possible MRSA infection        The patient will be undergoing incision and drainage today.  Continue IV vancomycin.        Candace Hayden MD  08/15/17  7:28 AM

## 2017-08-15 NOTE — ANESTHESIA POSTPROCEDURE EVALUATION
Patient: Manuela Johnson    Procedure Summary     Date Anesthesia Start Anesthesia Stop Room / Location    08/15/17 [id number] [id number]  [facility] / [facility]       Procedure Diagnosis Surgeon Provider    INCISION AND DRAINAGE ABSCESS (Left Arm Upper) Axillary abscess; Cellulitis of arm, left  (Axillary abscess [L02.419]; Cellulitis of arm, left [L03.114]) MD Doron Gregory MD          Anesthesia Type: general  Last vitals  BP   132/69 (08/15/17 1630)    Temp   97.8 °F (36.6 °C) (08/15/17 1630)    Pulse   69 (08/15/17 1630)   Resp   18 (08/15/17 1630)    SpO2   99 % (08/15/17 1630)      Post Anesthesia Care and Evaluation    Patient location during evaluation: bedside  Patient participation: complete - patient participated  Level of consciousness: awake and alert  Pain score: 1  Pain management: adequate  Airway patency: patent  Anesthetic complications: No anesthetic complications  PONV Status: none  Cardiovascular status: acceptable  Respiratory status: acceptable  Hydration status: acceptable

## 2017-08-15 NOTE — PLAN OF CARE
Problem: Patient Care Overview (Adult)  Goal: Plan of Care Review  Outcome: Ongoing (interventions implemented as appropriate)    08/15/17 0912   Coping/Psychosocial Response Interventions   Plan Of Care Reviewed With patient   Patient Care Overview   Progress no change         Problem: Infection, Risk/Actual (Adult)  Goal: Identify Related Risk Factors and Signs and Symptoms  Outcome: Ongoing (interventions implemented as appropriate)    08/14/17 1511   Infection, Risk/Actual   Infection, Risk/Actual: Related Risk Factors skin integrity impairment   Signs and Symptoms (Infection, Risk/Actual) pain         Problem: Skin Integrity Impairment, Risk/Actual (Adult)  Goal: Identify Related Risk Factors and Signs and Symptoms  Outcome: Ongoing (interventions implemented as appropriate)    08/14/17 1511   Skin Integrity Impairment, Risk/Actual   Signs and Symptoms (Skin Integrity Impairment) induration;inflammation         Problem: Pain, Acute (Adult)  Goal: Identify Related Risk Factors and Signs and Symptoms  Outcome: Ongoing (interventions implemented as appropriate)    08/14/17 1511   Pain, Acute   Related Risk Factors (Acute Pain) persistent pain;infection   Signs and Symptoms (Acute Pain) verbalization of pain descriptors         Problem: Cellulitis (Adult)  Goal: Signs and Symptoms of Listed Potential Problems Will be Absent or Manageable (Cellulitis)  Outcome: Ongoing (interventions implemented as appropriate)    08/14/17 1511   Cellulitis   Problems Assessed (Cellulitis) pain;infection   Problems Present (Cellulitis) pain;infection

## 2017-08-15 NOTE — OP NOTE
Manuela Johnson  8/14/2017 - 8/15/2017      Operative Progress Note:   Surgeon and Assistant: Dr. Waldrop     Pre-Operative Diagnosis: abscess left arm 4 cm     Post-Operative Diagnosis: same     Procedure(s): incision and drainage abscess left arm     Indication: patient is a 57-year-old white female with above diagnosis     Type of Anesthesia Administered: IV general.  Infiltration Marcaine 0.5% with epinephrine     Estimated Blood Loss: Minimal     Blood Products: None     Specimen Obtained/Removed culture     Complication(s):  None     Graft/Implant/Prosthetics/Implanted Device/Transplants: None     Findings: 4 cm abscess    Operative Report:  Patient was taken operating room laid no spine position and her bed.  IV general was given.  Left upper arm was prepped draped usual sterile fashion.  Marcaine 0.5% with epinephrine is infiltrated.  Incision was made and entered a 4 cm abscess cavity.  Cultures were taken.  The wounds irrigated.  It was packed.  Procedure terminated.  Patient tolerated procedure very well and returned recovery room in satisfactory condition.       Electronically Signed by: MD Moy Del Toro disclaimer:  Much of this encounter note is an electronic transcription/translation of spoken language to printed text. The electronic translation of spoken language may permit erroneous, or at times, nonsensical words or phrases to be inadvertently transcribed; Although I have reviewed the note for such errors, some may still exist.

## 2017-08-15 NOTE — PAYOR COMM NOTE
"[facility]  NPI:[id number]    Utilization Review  Contact: Jo Kerns RN  Phone: [phone]  Fax:[phone]    INITIATE INPATIENT AUTHORIZATION    ICD L03.114   L02.419  DR ANNA HOOK NPI: [id number]  Manuela Johnson (57 y.o. Female)     Date of Birth Social Security Number Address Home Phone MRN    1960  [address] [facility] [address] [phone] [id number]    [employer] Marital Status          Congregation        Admission Date Admission Type Admitting Provider Attending Provider Department, Room/Bed    8/14/17 Elective Anna Hook MD Jawed, Mohammad, MD [address], [address]/    Discharge Date Discharge Disposition Discharge Destination                      Attending Provider: Anna Hook MD     Allergies:  No Known Allergies    Isolation:  Contact   Infection:  MRSA (08/15/17)   Code Status:  FULL    Ht:  62.5\" (158.8 cm)   Wt:  150 lb 9.6 oz (68.3 kg)    Admission Cmt:  None   Principal Problem:  None                Active Insurance as of 8/14/2017     Primary Coverage     Payor Plan Insurance Group Employer/Plan Group    [employer] [place] [employer] [place]ICA[place]      Payor Plan Address Payor Plan Phone Number Effective From Effective To    [address] [address] [phone] 5/1/2016     [place] [address]       Subscriber Name Subscriber Birth Date Member ID       MANUELA JOHNSON [id number]                 Emergency Contacts      (Rel.) Home Phone Work Phone Mobile Phone    Willian Johnson (Spouse) [phone] -- --               History & Physical      Anna Hook MD at 8/14/2017  6:57 PM              Chief complaint  painful swollen area on the left arm     Subjective     Patient is a 57 y.o. female presents with painful swollen area on the left arm for about 5 days.  Symptom is started as redness of the skin on the medial and lower surface of the left arm, progressively area size has increased and she has developed a softer " painfull swelling in the middle of that area.  Patient has previous history of MRSA infection on her groin for which she was hospitalized received IV antibiotic and underwent incision and drainage in the remote past.  Today when she was examined in the office her history and examination findings were very much concerning for cellulitis and abscess which was needing incision and drainage and IV antibiotic and patient is admitted to the hospital for further care.      History  Past Medical History:   Diagnosis Date   • Arthritis    • Cancer     melanoma   • Coronary artery disease    • Hypertension    • Hypothyroid    • Sleep apnea      Past Surgical History:   Procedure Laterality Date   • ESSURE TUBAL LIGATION     • INCISION AND DRAINAGE ABSCESS       Family History   Problem Relation Age of Onset   • Hypertension Father    • Cancer Father    • Hypertension Brother    • Cancer Brother      Social History   Substance Use Topics   • Smoking status: Current Every Day Smoker     Packs/day: 1.00     Years: 47.00   • Smokeless tobacco: None   • Alcohol use No     Prescriptions Prior to Admission   Medication Sig Dispense Refill Last Dose   • alendronate (FOSAMAX) 70 MG tablet Take 70 mg by mouth Every 7 (Seven) Days. friday 08/11/2017   • ASPIR-LOW 81 MG EC tablet Take 81 mg by mouth daily.   8/14/2017 at 0900   • carbidopa-levodopa ER (SINEMET CR)  MG per tablet Take 2 tablets by mouth Every Night.   8/13/2017 at 2100   • cetirizine (zyrTEC) 10 MG tablet Take 10 mg by mouth Daily.   8/14/2017 at 0900   • citalopram (CeleXA) 20 MG tablet Take 20 mg by mouth Every Evening.   8/13/2017 at Unknown time   • cyclobenzaprine (FLEXERIL) 10 MG tablet Take 10 mg by mouth 3 (Three) Times a Day.   8/14/2017 at 0900   • DULERA 100-5 MCG/ACT inhaler Inhale 2 puffs 2 (Two) Times a Day.   8/14/2017 at 0900   • gabapentin (NEURONTIN) 600 MG tablet Take 600 mg by mouth 4 (Four) Times a Day As Needed (for moderate pain).    8/14/2017 at 1500   • ibuprofen (ADVIL,MOTRIN) 600 MG tablet Take 600 tablets by mouth 4 (Four) Times a Day As Needed for Mild Pain (1-3).   8/14/2017 at 1300   • levothyroxine (SYNTHROID, LEVOTHROID) 100 MCG tablet Take 100 mcg by mouth daily.   8/14/2017 at 0900   • losartan-hydrochlorothiazide (HYZAAR) 100-25 MG per tablet Take 1 tablet by mouth Every Night.   8/13/2017 at 2100    • spironolactone (ALDACTONE) 25 MG tablet Take 25 mg by mouth Daily.   8/14/2017 at 0900     Allergies:  Review of patient's allergies indicates no known allergies.       Review of Systems:   Review of Systems - General ROS: negative for - fever, malaise, weight gain or weight loss  Psychological ROS: negative for - anxiety, disorientation or hallucinations  Ophthalmic ROS: negative for - blurry vision or double vision  ENT ROS: negative for - hearing change or vertigo  Allergy and Immunology ROS: negative for - hives, insect bite sensitivity or immunodeficiency  Hematological and Lymphatic ROS: negative for - bruising or swollen lymph nodes  Endocrine ROS: negative for - galactorrhea or hair pattern changes, no polyuria or polydipsia.  Respiratory ROS: negative for - hemoptysis or sputum changes, cough or wheezing  Cardiovascular ROS: negative for - loss of consciousness, chest pain or palpitation  Gastrointestinal ROS: negative for - melena or stool incontinence, nausea vomiting , diarrhea or abdominal pain  Genito-Urinary ROS: negative for - genital ulcers or pelvic pain or dysuria  Musculoskeletal ROS: negative for - gait disturbance or muscular weakness  Neurological ROS: negative for - bowel and bladder control changes or seizures, no focal weakness  Dermatological ROS: Positive for redness, pain and swelling of the left arm.          Physical Exam:    Vital Signs  Temp:  [97.7 °F (36.5 °C)] 97.7 °F (36.5 °C)  Heart Rate:  [86] 86  Resp:  [20] 20  BP: (129)/(77) 129/77     General Appearance:    Alert, cooperative, in no acute  distress   Head:  Normocephalic, without obvious abnormality, atraumatic   Eyes:          Lids and lashes normal, conjunctivae and sclerae normal, no icterus, no pallor, corneas clear, PERRLA   Ears:  Ears appear intact with no abnormalities noted   Throat: No oral lesions, no thrush, oral mucosa moist   Neck: No adenopathy, supple, trachea midline, no thyromegaly, no carotid bruit, no JVD   Back:   no skin lesions, no erythema or scars, no tenderness to percussion or palpation.     Lungs:   Clear to auscultation,respirations regular, even and               Unlabored, no wheezing     Heart:  Regular rhythm and normal rate, normal S1 and S2, no        murmur, no gallop, no rub, no click   Abdomen:   Normal bowel sounds, no masses, no organomegaly, soft   non-tender, non-distended, no guarding, no rebound   tenderness   Extremities: Moves all extremities well, Left arm has redness, warmth, tenderness with central area of fluctuant swelling .   Pulses: Pulses palpable and equal bilaterally   Skin: No bleeding, bruising or rash   Lymph nodes: No palpable adenopathy   Neurologic: Cranial nerves 2 - 12 grossly intact, sensation intact, overall normal motor strength, DTR present and equal bilaterally             Labs:  Lab Results (last 24 hours)     Procedure Component Value Units Date/Time    C-reactive Protein [[id number]]  (Abnormal) Collected:  08/14/17 1717    Specimen:  Blood Updated:  08/14/17 1819     C-Reactive Protein 8.06 (H) mg/dL     Basic Metabolic Panel [[id number]]  (Abnormal) Collected:  08/14/17 1717    Specimen:  Blood Updated:  08/14/17 1819     Glucose 129 (H) mg/dL      BUN 20 mg/dL      Creatinine 0.57 mg/dL      Sodium 138 mmol/L      Potassium 3.7 mmol/L      Chloride 105 mmol/L      CO2 30.4 mmol/L      Calcium 9.0 mg/dL      eGFR Non African Amer 109 mL/min/1.73      BUN/Creatinine Ratio 35.1 (H)     Anion Gap 2.6 (L) mmol/L     Narrative:       GFR Normal >60  Chronic Kidney Disease <60  Kidney  Failure <15    Osmolality, Calculated [[id number]]  (Normal) Collected:  08/14/17 1717    Specimen:  Blood Updated:  08/14/17 1819     Osmolality Calc 280.0 mOsm/kg           Images:  Imaging Results (last 24 hours)     ** No results found for the last 24 hours. **             Assessment/Plan     Active Problems:    Cellulitis and abscess of the left arm     Possible MRSA infection        Patient is admitted on the medical floor.  I have empirically started the patient on IV vancomycin.  Surgical consult is requested for incision and drainage.      Candace Hayden MD  08/14/17  6:57 PM         Electronically signed by Candace Hayden MD at 8/14/2017  7:04 PM        Hospital Medications (all)       Dose Frequency Start End    acetaminophen (TYLENOL) tablet 650 mg 650 mg Every 4 Hours PRN 8/14/2017     Sig - Route: Take 2 tablets by mouth Every 4 (Four) Hours As Needed for Mild Pain . - Oral    aspirin EC tablet 81 mg 81 mg Daily 8/15/2017     Sig - Route: Take 1 tablet by mouth Daily. - Oral    bisacodyl (DULCOLAX) EC tablet 10 mg 10 mg Daily PRN 8/14/2017     Sig - Route: Take 2 tablets by mouth Daily As Needed for Constipation. - Oral    bisacodyl (DULCOLAX) suppository 10 mg 10 mg Daily PRN 8/14/2017     Sig - Route: Insert 1 suppository into the rectum Daily As Needed for Constipation. - Rectal    budesonide-formoterol (SYMBICORT) 160-4.5 MCG/ACT inhaler 2 puff 2 puff 2 Times Daily - RT 8/14/2017     Sig - Route: Inhale 2 puffs 2 (Two) Times a Day. - Inhalation    carbidopa-levodopa (SINEMET)  MG per tablet 2 tablet 2 tablet Nightly 8/14/2017     Sig - Route: Take 2 tablets by mouth Every Night. - Oral    cetirizine (zyrTEC) tablet 10 mg 10 mg Daily 8/15/2017     Sig - Route: Take 1 tablet by mouth Daily. - Oral    citalopram (CeleXA) tablet 20 mg 20 mg Nightly 8/14/2017     Sig - Route: Take 1 tablet by mouth Every Night. - Oral    cyclobenzaprine (FLEXERIL) tablet 10 mg 10 mg Every 8 Hours Scheduled  "8/14/2017     Sig - Route: Take 1 tablet by mouth Every 8 (Eight) Hours. - Oral    enoxaparin (LOVENOX) syringe 40 mg 40 mg Nightly 8/14/2017     Sig - Route: Inject 0.4 mL under the skin Every Night. - Subcutaneous    gabapentin (NEURONTIN) capsule 600 mg 600 mg Every 6 Hours PRN 8/14/2017     Sig - Route: Take 2 capsules by mouth Every 6 (Six) Hours As Needed (moderate pain). - Oral    hydrochlorothiazide (HYDRODIURIL) tablet 25 mg 25 mg Nightly 8/14/2017     Sig - Route: Take 1 tablet by mouth Every Night. - Oral    ibuprofen (ADVIL,MOTRIN) tablet 600 mg 600 mg Every 6 Hours PRN 8/14/2017     Sig - Route: Take 1 tablet by mouth Every 6 (Six) Hours As Needed for Moderate Pain . - Oral    levothyroxine (SYNTHROID, LEVOTHROID) tablet 100 mcg 100 mcg Every Early Morning 8/15/2017     Sig - Route: Take 1 tablet by mouth Every Morning. - Oral    losartan (COZAAR) tablet 100 mg 100 mg Nightly 8/14/2017     Sig - Route: Take 2 tablets by mouth Every Night. - Oral    magnesium hydroxide (MILK OF MAGNESIA) suspension 2400 mg/10mL 10 mL 10 mL Daily PRN 8/14/2017     Sig - Route: Take 10 mL by mouth Daily As Needed for Constipation. - Oral    ondansetron (ZOFRAN) injection 4 mg 4 mg Every 6 Hours PRN 8/14/2017     Sig - Route: Infuse 2 mL into a venous catheter Every 6 (Six) Hours As Needed for Nausea or Vomiting. - Intravenous    Linked Group 1:  \"Or\" Linked Group Details        ondansetron (ZOFRAN) tablet 4 mg 4 mg Every 6 Hours PRN 8/14/2017     Sig - Route: Take 1 tablet by mouth Every 6 (Six) Hours As Needed for Nausea or Vomiting. - Oral    Linked Group 1:  \"Or\" Linked Group Details        ondansetron ODT (ZOFRAN-ODT) disintegrating tablet 4 mg 4 mg Every 6 Hours PRN 8/14/2017     Sig - Route: Take 1 tablet by mouth Every 6 (Six) Hours As Needed for Nausea or Vomiting. - Oral    Linked Group 1:  \"Or\" Linked Group Details        pneumococcal polysaccharide 23-valent (PNEUMOVAX-23) vaccine 0.5 mL 0.5 mL During " Hospitalization 8/14/2017     Sig - Route: Inject 0.5 mL into the shoulder, thigh, or buttocks During Hospitalization for Immunization. - Intramuscular    sennosides-docusate sodium (SENOKOT-S) 8.6-50 MG tablet 2 tablet 2 tablet 2 Times Daily PRN 8/14/2017     Sig - Route: Take 2 tablets by mouth 2 (Two) Times a Day As Needed for Constipation. - Oral    sodium chloride 0.9 % flush 1-10 mL 1-10 mL As Needed 8/14/2017     Sig - Route: Infuse 1-10 mL into a venous catheter As Needed for Line Care. - Intravenous    spironolactone (ALDACTONE) tablet 25 mg 25 mg Daily 8/15/2017     Sig - Route: Take 1 tablet by mouth Daily. - Oral    vancomycin (VANCOCIN) 1,250 mg in sodium chloride 0.9 % 250 mL IVPB 1,250 mg Every 12 Hours 8/15/2017     Sig - Route: Infuse 1,250 mg into a venous catheter Every 12 (Twelve) Hours. - Intravenous    vancomycin (VANCOCIN) 1,500 mg in sodium chloride 0.9 % 500 mL IVPB 1,500 mg Once 8/14/2017 8/14/2017    Sig - Route: Infuse 1,500 mg into a venous catheter 1 (One) Time. - Intravenous    Vancomycin Pharmacy Intermittent Dosing  Daily 8/14/2017     Sig - Route: Daily. - Does not apply    losartan-hydrochlorothiazide (HYZAAR) 100-25 MG per tablet 1 tablet (Discontinued) 1 tablet Nightly 8/14/2017 8/14/2017    Sig - Route: Take 1 tablet by mouth Every Night. - Oral    Reason for Discontinue: Formulary change    Pharmacy to dose vancomycin (Discontinued)  Continuous PRN 8/14/2017 8/14/2017    Sig - Route: Continuous As Needed for Consult. - Does not apply    Reason for Discontinue: Dose adjustment             Physician Progress Notes (last 24 hours) (Notes from 8/14/2017 10:14 AM through 8/15/2017 10:14 AM)      Candace Hayden MD at 8/15/2017  7:27 AM  Version 1 of 1              LOS: 1 day   Patient Care Team:  Candace Hayden MD as PCP - General  Candace Hayden MD as PCP - Family Medicine    Brief history of present illness:   Patient is a 57 y.o. female presents with painful swollen area on the  left arm for about 5 days.  Symptom is started as redness of the skin on the medial and lower surface of the left arm, progressively area size has increased and she has developed a softer painfull swelling in the middle of that area.  Patient has previous history of MRSA infection on her groin for which she was hospitalized received IV antibiotic and underwent incision and drainage in the remote past.  Today when she was examined in the office her history and examination findings were very much concerning for cellulitis and abscess which was needing incision and drainage and IV antibiotic and patient is admitted to the hospital for further care.         Subjective   Left arm pain and swelling.    History taken from: patient and chart    Interval History:     No significant changes to patients past history, family history, and social history.     Review of Systems:   Review of Systems - General ROS: negative for - fever, malaise, weight gain or weight loss  Psychological ROS: negative for - anxiety, disorientation or hallucinations  Ophthalmic ROS: negative for - blurry vision or double vision  ENT ROS: negative for - hearing change or vertigo  Allergy and Immunology ROS: negative for - hives, insect bite sensitivity or immunodeficiency  Hematological and Lymphatic ROS: negative for - bruising or swollen lymph nodes  Endocrine ROS: negative for - galactorrhea or hair pattern changes, no polyuria or polydipsia.  Respiratory ROS: negative for - hemoptysis or sputum changes, cough or wheezing  Cardiovascular ROS: negative for - loss of consciousness, chest pain or palpitation  Gastrointestinal ROS: negative for - melena or stool incontinence, nausea vomiting , diarrhea or abdominal pain  Genito-Urinary ROS: negative for - genital ulcers or pelvic pain or dysuria  Musculoskeletal ROS: negative for - gait disturbance or muscular weakness  Neurological ROS: negative for - bowel and bladder control changes or seizures, no  focal weakness  Dermatological ROS: Positive for redness, pain and swelling of the left arm.         Physical Exam:    Vital Signs  Temp:  [97.7 °F (36.5 °C)-97.9 °F (36.6 °C)] 97.9 °F (36.6 °C)  Heart Rate:  [85-86] 85  Resp:  [18-20] 18  BP: (119-129)/(69-77) 119/69   General Appearance:     Alert, cooperative, in no acute distress   Head:  Normocephalic, without obvious abnormality, atraumatic   Eyes:          Lids and lashes normal, conjunctivae and sclerae normal, no icterus, no pallor, corneas clear, PERRLA   Ears:  Ears appear intact with no abnormalities noted   Throat: No oral lesions, no thrush, oral mucosa moist   Neck: No adenopathy, supple, trachea midline, no thyromegaly, no carotid bruit, no JVD   Back:   no skin lesions, no erythema or scars, no tenderness to percussion or palpation.     Lungs:   Clear to auscultation,respirations regular, even and               Unlabored, no wheezing     Heart:  Regular rhythm and normal rate, normal S1 and S2, no        murmur, no gallop, no rub, no click   Abdomen:   Normal bowel sounds, no masses, no organomegaly, soft   non-tender, non-distended, no guarding, no rebound   tenderness   Extremities: Moves all extremities well, Left arm has redness, warmth, tenderness with central area of fluctuant swelling .   Pulses: Pulses palpable and equal bilaterally   Skin: No bleeding, bruising or rash   Lymph nodes: No palpable adenopathy   Neurologic: Cranial nerves 2 - 12 grossly intact, sensation intact, overall normal motor strength, DTR present and equal bilaterally        Labs:    Results from last 7 days  Lab Units 08/14/17  1917 08/14/17  1717   CRP mg/dL  --  8.06*   WBC 10*3/mm3 9.11  --    HEMOGLOBIN g/dL 12.3  --    HEMATOCRIT % 36.5*  --    MCV fL 86.3  --    MCHC g/dL 33.7  --    PLATELETS 10*3/mm3 288  --            Results from last 7 days  Lab Units 08/14/17  1717   SODIUM mmol/L 138   POTASSIUM mmol/L 3.7   CHLORIDE mmol/L 105   CO2 mmol/L 30.4   BUN  mg/dL 20   CREATININE mg/dL 0.57   EGFR IF NONAFRICN AM mL/min/1.73 109   CALCIUM mg/dL 9.0   GLUCOSE mg/dL 129*   Estimated Creatinine Clearance: 99.9 mL/min (by C-G formula based on Cr of 0.57).  No results found for: AMMONIA  Lab Results   Component Value Date    CKMB <0.2 12/26/2014         No results found for: HGBA1C  Lab Results   Component Value Date    TSH 0.551 01/23/2015     No results found for: PREGTESTUR, PREGSERUM, HCG, HCGQUANT    Pain Management Panel     There is no flowsheet data to display.                Cultures:  @LASTLAB3(BLOODCX:*,URINECX:*,WOUNDCX:*,MRSACX:*,RESPCX:*,STOOLCX:*)@    Lab Results   Component Value Date    CKMB <0.2 12/26/2014             Images:  Imaging Results (last 24 hours)     ** No results found for the last 24 hours. **           Active Meds      Current Facility-Administered Medications:   •  acetaminophen (TYLENOL) tablet 650 mg, 650 mg, Oral, Q4H PRN, Candace Hayden MD  •  aspirin EC tablet 81 mg, 81 mg, Oral, Daily, Candace Hayden MD  •  bisacodyl (DULCOLAX) EC tablet 10 mg, 10 mg, Oral, Daily PRN, Candace Hayden MD  •  bisacodyl (DULCOLAX) suppository 10 mg, 10 mg, Rectal, Daily PRN, Candace Hayden MD  •  budesonide-formoterol (SYMBICORT) 160-4.5 MCG/ACT inhaler 2 puff, 2 puff, Inhalation, BID - RT, Candace Hayden MD  •  carbidopa-levodopa (SINEMET)  MG per tablet 2 tablet, 2 tablet, Oral, Nightly, Candace Hayden MD, 2 tablet at 08/14/17 2034  •  cetirizine (zyrTEC) tablet 10 mg, 10 mg, Oral, Daily, Candace Hayden MD  •  citalopram (CeleXA) tablet 20 mg, 20 mg, Oral, Nightly, Candace Hayden MD, 20 mg at 08/14/17 2034  •  cyclobenzaprine (FLEXERIL) tablet 10 mg, 10 mg, Oral, Q8H, Candace Hayden MD, 10 mg at 08/14/17 2034  •  enoxaparin (LOVENOX) syringe 40 mg, 40 mg, Subcutaneous, Nightly, Candace Hayden MD  •  gabapentin (NEURONTIN) capsule 600 mg, 600 mg, Oral, Q6H PRN, Candace Hayden MD  •  hydrochlorothiazide (HYDRODIURIL) tablet 25 mg, 25 mg,  Oral, Nightly, Shantel Coto, [facility], 25 mg at 08/14/17 2034  •  ibuprofen (ADVIL,MOTRIN) tablet 600 mg, 600 mg, Oral, Q6H PRN, Candace Hayden MD  •  levothyroxine (SYNTHROID, LEVOTHROID) tablet 100 mcg, 100 mcg, Oral, Q AM, Candace Hayden MD  •  losartan (COZAAR) tablet 100 mg, 100 mg, Oral, Nightly, Shantel Coto, [facility]  •  magnesium hydroxide (MILK OF MAGNESIA) suspension 2400 mg/10mL 10 mL, 10 mL, Oral, Daily PRN, Candace Hayden MD  •  ondansetron (ZOFRAN) tablet 4 mg, 4 mg, Oral, Q6H PRN **OR** ondansetron ODT (ZOFRAN-ODT) disintegrating tablet 4 mg, 4 mg, Oral, Q6H PRN **OR** ondansetron (ZOFRAN) injection 4 mg, 4 mg, Intravenous, Q6H PRN, Candace Hayden MD  •  pneumococcal polysaccharide 23-valent (PNEUMOVAX-23) vaccine 0.5 mL, 0.5 mL, Intramuscular, During Hospitalization, Candace Hayden MD  •  sennosides-docusate sodium (SENOKOT-S) 8.6-50 MG tablet 2 tablet, 2 tablet, Oral, BID PRN, Candace Hayden MD  •  sodium chloride 0.9 % flush 1-10 mL, 1-10 mL, Intravenous, PRN, Candace Hayden MD  •  spironolactone (ALDACTONE) tablet 25 mg, 25 mg, Oral, Daily, Candace Hayden MD  •  vancomycin (VANCOCIN) 1,250 mg in sodium chloride 0.9 % 250 mL IVPB, 1,250 mg, Intravenous, Q12H, Rolf Schuler [facility], 1,250 mg at 08/15/17 0411  •  Vancomycin Pharmacy Intermittent Dosing, , Does not apply, Daily, Candace Hayden MD    Assessment/Plan     Active Problems:    Cellulitis and abscess of the left arm     Possible MRSA infection        The patient will be undergoing incision and drainage today.  Continue IV vancomycin.        Candace Hayden MD  08/15/17  7:28 AM           Electronically signed by Candace Hayden MD at 8/15/2017  7:30 AM           Consult Notes (last 24 hours) (Notes from 8/14/2017 10:14 AM through 8/15/2017 10:14 AM)      Ramakrishna Waldrop MD at 8/14/2017  4:36 PM  Version 1 of 1     Consult Orders:    1. Inpatient Consult to General Surgery [[id number]] ordered by Candace Hayden MD at 08/14/17  1201                Consultation note    Referring physician: Candace Hayden MD    Consulting Physician: Dr. Ramakrishna Waldrop MD    Reason for consultation: abscess left arm    No chief complaint on file.      HPI:   The patient is a very pleasant 57 y.o. years old female admitted to the hospital by Dr. Woodward for an abscess or left arm.  This is been present for a few days.  She's had to have an abscess drain in the right axilla in the past.  No other modifying factors or associated symptoms    Past Medical History:   Diagnosis Date   • Arthritis    • Cancer     melanoma   • Coronary artery disease    • Hypertension    • Hypothyroid    • Sleep apnea        Past Surgical History:   Procedure Laterality Date   • ESSURE TUBAL LIGATION     • INCISION AND DRAINAGE ABSCESS           Current Facility-Administered Medications:   •  acetaminophen (TYLENOL) tablet 650 mg, 650 mg, Oral, Q4H PRN, Candace Hayden MD  •  bisacodyl (DULCOLAX) EC tablet 10 mg, 10 mg, Oral, Daily PRN, Candace Hayden MD  •  bisacodyl (DULCOLAX) suppository 10 mg, 10 mg, Rectal, Daily PRN, Candace Hayden MD  •  enoxaparin (LOVENOX) syringe 40 mg, 40 mg, Subcutaneous, Nightly, Candace Hayden MD  •  magnesium hydroxide (MILK OF MAGNESIA) suspension 2400 mg/10mL 10 mL, 10 mL, Oral, Daily PRN, Candace Hayden MD  •  ondansetron (ZOFRAN) tablet 4 mg, 4 mg, Oral, Q6H PRN **OR** ondansetron ODT (ZOFRAN-ODT) disintegrating tablet 4 mg, 4 mg, Oral, Q6H PRN **OR** ondansetron (ZOFRAN) injection 4 mg, 4 mg, Intravenous, Q6H PRN, Candace Hayden MD  •  pneumococcal polysaccharide 23-valent (PNEUMOVAX-23) vaccine 0.5 mL, 0.5 mL, Intramuscular, During Hospitalization, Canadce Hayden MD  •  sennosides-docusate sodium (SENOKOT-S) 8.6-50 MG tablet 2 tablet, 2 tablet, Oral, BID PRN, Candace Hayden MD  •  sodium chloride 0.9 % flush 1-10 mL, 1-10 mL, Intravenous, PRN, Candace Hayden MD  •  vancomycin (VANCOCIN) 1,500 mg in sodium chloride 0.9 % 500 mL IVPB, 1,500 mg,  Intravenous, Once, Candace Hayden MD    No Known Allergies    Social History     Social History   • Marital status:      Spouse name: N/A   • Number of children: N/A   • Years of education: N/A     Occupational History   • Not on file.     Social History Main Topics   • Smoking status: Current Every Day Smoker     Packs/day: 1.00     Years: 47.00   • Smokeless tobacco: Not on file   • Alcohol use No   • Drug use: No   • Sexual activity: Not on file     Other Topics Concern   • Not on file     Social History Narrative       Family History   Problem Relation Age of Onset   • Hypertension Father    • Cancer Father    • Hypertension Brother    • Cancer Brother        ROS:   Constitutional: denies any weight changes, fatigue or weakness.  Eyes: : denies blurred or double vision  Cardiovascular: denies chest pain, palpitations, edemas.  Respiratory: denies cough, sputum, SOB.  Gastrointestinal: denies N&V, abd pain, diarrhea, constipation.  Genitourinary: denies dysuria, frequency.  Endocrine: denies cold intolerance, lethargy and flushing.  Hem: denies excessive bruising and postop bleeding.  Musculoskeletal: denies weakness, joint swelling, pain or stiffness.  Neuro: denies seizures, CVA, paresthesia, or peripheral neuropathy.   Skin: denies change in nevi, rashes, masses.        Physical Exam:       General Appearance:    Alert, cooperative, in no acute distress   Head:    Normocephalic, without obvious abnormality, atraumatic   Eyes:            Lids and lashes normal, conjunctivae and sclerae normal, no   icterus, no pallor, corneas clear, PERRLA   Ears:    Ears appear intact with no abnormalities noted   Throat:   No oral lesions, no thrush, oral mucosa moist   Neck:   No adenopathy, supple, trachea midline, no thyromegaly, no   carotid bruit, no JVD   Back:     No kyphosis present, no scoliosis present, no skin lesions,      erythema or scars, no tenderness to percussion or                   palpation,    range of motion normal   Lungs:     Clear to auscultation,respirations regular, even and                  unlabored    Heart:    Regular rhythm and normal rate, normal S1 and S2, no            murmur, no gallop, no rub, no click   Chest Wall:    No abnormalities observed   Abdomen:    Soft nontender    Rectal:     Deferred   Extremities:   Moves all extremities well, no edema, no cyanosis, no             redness   Pulses:   Pulses palpable and equal bilaterally   Skin:   Left upper arm just distal to the axilla there is a 3 cm abscess with surrounding cellulitis    Lymph nodes:   No palpable adenopathy   Neurologic:   Cranial nerves 2 - 12 grossly intact, sensation intact, DTR       present and equal bilaterally     Vitals:    08/14/17 1456   BP: 129/77   Pulse: 86   Resp: 20   Temp: 97.7 °F (36.5 °C)   SpO2: 93%       Lab Results   Component Value Date    GLUCOSE 134 (H) 05/19/2016    BUN 14 05/19/2016    CREATININE 0.62 05/19/2016    BCR 21.8 05/19/2016    CO2 21.4 (L) 05/19/2016    CALCIUM 8.5 05/19/2016    ALBUMIN 4.6 01/23/2015    LABIL2 1.5 01/23/2015    AST 18 01/23/2015    ALT 16 01/23/2015     No results found for: WBC, RBC, HGB, HCT, MCV, MCH, MCHC, RDW, RDWSD, MPV, PLT, NEUTRORELPCT, LYMPHORELPCT, MONORELPCT, EOSRELPCT, BASORELPCT, AUTOIGPER, NEUTROABS, LYMPHSABS, MONOSABS, EOSABS, BASOSABS, AUTOIGNUM, NRBC    Imaging Results (last 72 hours)     ** No results found for the last 72 hours. **            Assessment:   Abscess left arm    Plan:  Incision and drainage in the operating room.  Unfortunately the patient has are today so we'll have to do it tomorrow morning.      Dragon disclaimer:  Much of this encounter note is an electronic transcription/translation of spoken language to printed text. The electronic translation of spoken language may permit erroneous, or at times, nonsensical words or phrases to be inadvertently transcribed; Although I have reviewed the note for such errors, some may still  exist.     Electronically signed by Ramakrishna Waldrop MD at 8/14/2017  4:38 PM

## 2017-08-15 NOTE — NURSING NOTE
Patient had difficulty removing ring prior to surgery. Notified surgery in report. Pre op called and stated they had ring and patient requested I pick it up and deliver it to her room. Hellen Ornelas verified that I placed patients ring with her other jewelry. When patient was brought back to room after surgery, Haris Jung was present to verify that patient received her ring back. Patient acknowledged her ring with her other jewelry. Patients  arrived 1715.

## 2017-08-15 NOTE — ANESTHESIA PREPROCEDURE EVALUATION
Anesthesia Evaluation     no history of anesthetic complications:  NPO Solid Status: > 8 hours  NPO Liquid Status: > 8 hours     Airway   Mallampati: II  TM distance: >3 FB  Neck ROM: full  no difficulty expected  Dental - normal exam     Pulmonary - normal exam   (+) sleep apnea,   Cardiovascular - normal exam    (+) hypertension, CAD,       Neuro/Psych  GI/Hepatic/Renal/Endo    (+)  hypothyroidism,     Musculoskeletal     Abdominal  - normal exam   Substance History      OB/GYN          Other                                        Anesthesia Plan    ASA 2     general     intravenous induction   Anesthetic plan and risks discussed with patient.

## 2017-08-16 LAB — VANCOMYCIN TROUGH SERPL-MCNC: 13.4 MCG/ML (ref 5–15)

## 2017-08-16 PROCEDURE — 99024 POSTOP FOLLOW-UP VISIT: CPT | Performed by: SURGERY

## 2017-08-16 PROCEDURE — 25010000002 VANCOMYCIN PER 500 MG

## 2017-08-16 PROCEDURE — 25010000002 ENOXAPARIN PER 10 MG: Performed by: SURGERY

## 2017-08-16 PROCEDURE — 94799 UNLISTED PULMONARY SVC/PX: CPT

## 2017-08-16 PROCEDURE — G0378 HOSPITAL OBSERVATION PER HR: HCPCS

## 2017-08-16 PROCEDURE — 80202 ASSAY OF VANCOMYCIN: CPT

## 2017-08-16 RX ADMIN — OXYCODONE HYDROCHLORIDE AND ACETAMINOPHEN 1 TABLET: 10; 325 TABLET ORAL at 12:33

## 2017-08-16 RX ADMIN — BUDESONIDE AND FORMOTEROL FUMARATE DIHYDRATE 2 PUFF: 160; 4.5 AEROSOL RESPIRATORY (INHALATION) at 19:07

## 2017-08-16 RX ADMIN — CYCLOBENZAPRINE HYDROCHLORIDE 10 MG: 10 TABLET, FILM COATED ORAL at 05:17

## 2017-08-16 RX ADMIN — CYCLOBENZAPRINE HYDROCHLORIDE 10 MG: 10 TABLET, FILM COATED ORAL at 22:00

## 2017-08-16 RX ADMIN — LEVOTHYROXINE SODIUM 100 MCG: 100 TABLET ORAL at 16:15

## 2017-08-16 RX ADMIN — HYDROCHLOROTHIAZIDE 25 MG: 25 TABLET ORAL at 20:00

## 2017-08-16 RX ADMIN — BUDESONIDE AND FORMOTEROL FUMARATE DIHYDRATE 2 PUFF: 160; 4.5 AEROSOL RESPIRATORY (INHALATION) at 07:17

## 2017-08-16 RX ADMIN — CETIRIZINE HYDROCHLORIDE 10 MG: 10 TABLET ORAL at 09:02

## 2017-08-16 RX ADMIN — ASPIRIN 81 MG: 81 TABLET ORAL at 09:02

## 2017-08-16 RX ADMIN — IBUPROFEN 600 MG: 600 TABLET ORAL at 16:13

## 2017-08-16 RX ADMIN — CYCLOBENZAPRINE HYDROCHLORIDE 10 MG: 10 TABLET, FILM COATED ORAL at 14:14

## 2017-08-16 RX ADMIN — VANCOMYCIN HYDROCHLORIDE 1250 MG: 5 INJECTION, POWDER, LYOPHILIZED, FOR SOLUTION INTRAVENOUS at 05:17

## 2017-08-16 RX ADMIN — LOSARTAN POTASSIUM 100 MG: 50 TABLET, FILM COATED ORAL at 20:00

## 2017-08-16 RX ADMIN — CITALOPRAM HYDROBROMIDE 20 MG: 20 TABLET ORAL at 20:00

## 2017-08-16 RX ADMIN — CARBIDOPA AND LEVODOPA 2 TABLET: 25; 100 TABLET ORAL at 20:00

## 2017-08-16 RX ADMIN — VANCOMYCIN HYDROCHLORIDE 1500 MG: 5 INJECTION, POWDER, LYOPHILIZED, FOR SOLUTION INTRAVENOUS at 17:42

## 2017-08-16 RX ADMIN — SPIRONOLACTONE 25 MG: 25 TABLET, FILM COATED ORAL at 09:02

## 2017-08-16 NOTE — PROGRESS NOTES
Pharmacy Kinetic Note  Day 3 Vancomycin for the treatment of left arm cellulitis. Steady state trough level is subtherapeutic at 13.4mg/L. Will increase the dosage to 1500mg IV q12h to target a trough 15-20mg/L. Will continue to follow.  Thank You,  Shantel ReynosoD

## 2017-08-16 NOTE — PLAN OF CARE
Problem: Patient Care Overview (Adult)  Goal: Plan of Care Review  Outcome: Ongoing (interventions implemented as appropriate)    08/15/17 0912 08/16/17 0905   Coping/Psychosocial Response Interventions   Plan Of Care Reviewed With --  patient   Patient Care Overview   Progress no change --          Problem: Infection, Risk/Actual (Adult)  Goal: Identify Related Risk Factors and Signs and Symptoms  Outcome: Ongoing (interventions implemented as appropriate)    08/14/17 1511   Infection, Risk/Actual   Infection, Risk/Actual: Related Risk Factors skin integrity impairment   Signs and Symptoms (Infection, Risk/Actual) pain       Goal: Infection Prevention/Resolution  Outcome: Ongoing (interventions implemented as appropriate)    08/16/17 0307   Infection, Risk/Actual (Adult)   Infection Prevention/Resolution making progress toward outcome         Problem: Skin Integrity Impairment, Risk/Actual (Adult)  Goal: Identify Related Risk Factors and Signs and Symptoms  Outcome: Ongoing (interventions implemented as appropriate)    08/14/17 1511   Skin Integrity Impairment, Risk/Actual   Signs and Symptoms (Skin Integrity Impairment) induration;inflammation       Goal: Skin Integrity/Wound Healing  Outcome: Ongoing (interventions implemented as appropriate)    08/16/17 1012   Skin Integrity Impairment, Risk/Actual (Adult)   Skin Integrity/Wound Healing making progress toward outcome         Problem: Pain, Acute (Adult)  Goal: Identify Related Risk Factors and Signs and Symptoms  Outcome: Ongoing (interventions implemented as appropriate)    08/14/17 1511   Pain, Acute   Related Risk Factors (Acute Pain) persistent pain;infection   Signs and Symptoms (Acute Pain) verbalization of pain descriptors       Goal: Acceptable Pain Control/Comfort Level  Outcome: Ongoing (interventions implemented as appropriate)    08/16/17 1012   Pain, Acute (Adult)   Acceptable Pain Control/Comfort Level making progress toward outcome          Problem: Cellulitis (Adult)  Goal: Signs and Symptoms of Listed Potential Problems Will be Absent or Manageable (Cellulitis)  Outcome: Ongoing (interventions implemented as appropriate)    08/14/17 1511   Cellulitis   Problems Assessed (Cellulitis) pain;infection   Problems Present (Cellulitis) pain;infection         Problem: Perioperative Period (Adult)  Goal: Signs and Symptoms of Listed Potential Problems Will be Absent or Manageable (Perioperative Period)  Outcome: Ongoing (interventions implemented as appropriate)    08/15/17 1404   Perioperative Period   Problems Assessed (Perioperative Period) pain   Problems Present (Perioperative Period) pain

## 2017-08-16 NOTE — PLAN OF CARE
Problem: Patient Care Overview (Adult)  Goal: Plan of Care Review  Outcome: Ongoing (interventions implemented as appropriate)    08/15/17 0912   Coping/Psychosocial Response Interventions   Plan Of Care Reviewed With patient   Patient Care Overview   Progress no change       Goal: Adult Individualization and Mutuality  Outcome: Ongoing (interventions implemented as appropriate)    Problem: Infection, Risk/Actual (Adult)  Goal: Identify Related Risk Factors and Signs and Symptoms  Outcome: Ongoing (interventions implemented as appropriate)    08/14/17 1511   Infection, Risk/Actual   Infection, Risk/Actual: Related Risk Factors skin integrity impairment   Signs and Symptoms (Infection, Risk/Actual) pain       Goal: Infection Prevention/Resolution  Outcome: Ongoing (interventions implemented as appropriate)    08/16/17 0307   Infection, Risk/Actual (Adult)   Infection Prevention/Resolution making progress toward outcome         Problem: Skin Integrity Impairment, Risk/Actual (Adult)  Goal: Identify Related Risk Factors and Signs and Symptoms  Outcome: Ongoing (interventions implemented as appropriate)    08/14/17 1511   Skin Integrity Impairment, Risk/Actual   Signs and Symptoms (Skin Integrity Impairment) induration;inflammation       Goal: Skin Integrity/Wound Healing  Outcome: Ongoing (interventions implemented as appropriate)    08/16/17 0307   Skin Integrity Impairment, Risk/Actual (Adult)   Skin Integrity/Wound Healing making progress toward outcome         Problem: Pain, Acute (Adult)  Goal: Identify Related Risk Factors and Signs and Symptoms  Outcome: Ongoing (interventions implemented as appropriate)    08/14/17 1511   Pain, Acute   Related Risk Factors (Acute Pain) persistent pain;infection   Signs and Symptoms (Acute Pain) verbalization of pain descriptors       Goal: Acceptable Pain Control/Comfort Level  Outcome: Ongoing (interventions implemented as appropriate)    08/16/17 0307   Pain, Acute (Adult)    Acceptable Pain Control/Comfort Level making progress toward outcome         Problem: Cellulitis (Adult)  Goal: Signs and Symptoms of Listed Potential Problems Will be Absent or Manageable (Cellulitis)  Outcome: Ongoing (interventions implemented as appropriate)    08/14/17 1511   Cellulitis   Problems Assessed (Cellulitis) pain;infection   Problems Present (Cellulitis) pain;infection

## 2017-08-16 NOTE — PROGRESS NOTES
Postoperative day #1 incision and drainage abscess left arm.    Afebrile vital signs stable.    Dressings in place.    Patient to have normal saline wet-to-dry dressings daily.    From my point of view okay for her to be discharged tomorrow.  I will follow her wound in the office.  At discharge please make an appointment for her to see me in one week and continue normal saline wet-to-dry dressings daily.

## 2017-08-16 NOTE — PROGRESS NOTES
LOS: 2 days   Patient Care Team:  Candace Hayden MD as PCP - General  Candace Hayden MD as PCP - Family Medicine    Brief history of present illness:   Patient is a 57 y.o. female presents with painful swollen area on the left arm for about 5 days.  Symptom is started as redness of the skin on the medial and lower surface of the left arm, progressively area size has increased and she has developed a softer painfull swelling in the middle of that area.  Patient has previous history of MRSA infection on her groin for which she was hospitalized received IV antibiotic and underwent incision and drainage in the remote past.  Today when she was examined in the office her history and examination findings were very much concerning for cellulitis and abscess which was needing incision and drainage and IV antibiotic and patient is admitted to the hospital for further care.         Subjective   Patient had incision and drainage of the left arm abscess.  She is feeling much better.    History taken from: patient and chart    Interval History:     No significant changes to patients past history, family history, and social history.     Review of Systems:   Review of Systems - General ROS: negative for - fever, malaise, weight gain or weight loss  Psychological ROS: negative for - anxiety, disorientation or hallucinations  Ophthalmic ROS: negative for - blurry vision or double vision  ENT ROS: negative for - hearing change or vertigo  Allergy and Immunology ROS: negative for - hives, insect bite sensitivity or immunodeficiency  Hematological and Lymphatic ROS: negative for - bruising or swollen lymph nodes  Endocrine ROS: negative for - galactorrhea or hair pattern changes, no polyuria or polydipsia.  Respiratory ROS: negative for - hemoptysis or sputum changes, cough or wheezing  Cardiovascular ROS: negative for - loss of consciousness, chest pain or palpitation  Gastrointestinal ROS: negative for - melena or stool  incontinence, nausea vomiting , diarrhea or abdominal pain  Genito-Urinary ROS: negative for - genital ulcers or pelvic pain or dysuria  Musculoskeletal ROS: negative for - gait disturbance or muscular weakness  Neurological ROS: negative for - bowel and bladder control changes or seizures, no focal weakness  Dermatological ROS: Positive for redness, pain and swelling of the left arm s/p incision and drainage         Physical Exam:    Vital Signs  Temp:  [97.1 °F (36.2 °C)-98.1 °F (36.7 °C)] 97.9 °F (36.6 °C)  Heart Rate:  [64-83] 64  Resp:  [10-20] 18  BP: (110-160)/(62-97) 110/62   General Appearance:     Alert, cooperative, in no acute distress   Head:  Normocephalic, without obvious abnormality, atraumatic   Eyes:          Lids and lashes normal, conjunctivae and sclerae normal, no icterus, no pallor, corneas clear, PERRLA   Ears:  Ears appear intact with no abnormalities noted   Throat: No oral lesions, no thrush, oral mucosa moist   Neck: No adenopathy, supple, trachea midline, no thyromegaly, no carotid bruit, no JVD   Back:   no skin lesions, no erythema or scars, no tenderness to percussion or palpation.     Lungs:   Clear to auscultation,respirations regular, even and               Unlabored, no wheezing     Heart:  Regular rhythm and normal rate, normal S1 and S2, no        murmur, no gallop, no rub, no click   Abdomen:   Normal bowel sounds, no masses, no organomegaly, soft   non-tender, non-distended, no guarding, no rebound   tenderness   Extremities: Moves all extremities well, Left arm has Dressings , status post incision and drainage    Pulses: Pulses palpable and equal bilaterally   Skin: No bleeding, bruising or rash   Lymph nodes: No palpable adenopathy   Neurologic: Cranial nerves 2 - 12 grossly intact, sensation intact, overall normal motor strength, DTR present and equal bilaterally        Labs:    Results from last 7 days  Lab Units 08/14/17  1917 08/14/17  1717   CRP mg/dL  --  8.06*   WBC  10*3/mm3 9.11  --    HEMOGLOBIN g/dL 12.3  --    HEMATOCRIT % 36.5*  --    MCV fL 86.3  --    MCHC g/dL 33.7  --    PLATELETS 10*3/mm3 288  --            Results from last 7 days  Lab Units 08/14/17  1717   SODIUM mmol/L 138   POTASSIUM mmol/L 3.7   CHLORIDE mmol/L 105   CO2 mmol/L 30.4   BUN mg/dL 20   CREATININE mg/dL 0.57   EGFR IF NONAFRICN AM mL/min/1.73 109   CALCIUM mg/dL 9.0   GLUCOSE mg/dL 129*   Estimated Creatinine Clearance: 97 mL/min (by C-G formula based on Cr of 0.57).  No results found for: AMMONIA  Lab Results   Component Value Date    CKMB <0.2 12/26/2014         No results found for: HGBA1C  Lab Results   Component Value Date    TSH 0.551 01/23/2015     No results found for: PREGTESTUR, PREGSERUM, HCG, HCGQUANT    Pain Management Panel     There is no flowsheet data to display.                Cultures:  @LASTLAB3(BLOODCX:*,URINECX:*,WOUNDCX:*,MRSACX:*,RESPCX:*,STOOLCX:*)@    Lab Results   Component Value Date    CKMB <0.2 12/26/2014             Images:  Imaging Results (last 24 hours)     ** No results found for the last 24 hours. **           Active Meds      Current Facility-Administered Medications:   •  acetaminophen (TYLENOL) tablet 650 mg, 650 mg, Oral, Q4H PRN, Candace Hayden MD  •  aspirin EC tablet 81 mg, 81 mg, Oral, Daily, Candace Hayden MD  •  bisacodyl (DULCOLAX) EC tablet 10 mg, 10 mg, Oral, Daily PRN, Candace Hayden MD  •  bisacodyl (DULCOLAX) suppository 10 mg, 10 mg, Rectal, Daily PRN, Candace Hayden MD  •  budesonide-formoterol (SYMBICORT) 160-4.5 MCG/ACT inhaler 2 puff, 2 puff, Inhalation, BID - RT, Candace Hayden MD, 2 puff at 08/15/17 9974  •  carbidopa-levodopa (SINEMET)  MG per tablet 2 tablet, 2 tablet, Oral, Nightly, Candace Hayden MD, 2 tablet at 08/15/17 2019  •  cetirizine (zyrTEC) tablet 10 mg, 10 mg, Oral, Daily, Candace Hayden MD  •  citalopram (CeleXA) tablet 20 mg, 20 mg, Oral, Nightly, Candace Hayden MD, 20 mg at 08/15/17 2019  •  cyclobenzaprine  (FLEXERIL) tablet 10 mg, 10 mg, Oral, Q8H, Candace Hayden MD, 10 mg at 08/16/17 0517  •  enoxaparin (LOVENOX) syringe 40 mg, 40 mg, Subcutaneous, Daily, Ramakrishna Waldrop MD  •  gabapentin (NEURONTIN) capsule 600 mg, 600 mg, Oral, Q6H PRN, Candace Hayden MD, 600 mg at 08/15/17 1241  •  hydrochlorothiazide (HYDRODIURIL) tablet 25 mg, 25 mg, Oral, Nightly, Shantel Coto, [facility], 25 mg at 08/15/17 2019  •  ibuprofen (ADVIL,MOTRIN) tablet 600 mg, 600 mg, Oral, Q6H PRN, Candace Hayden MD  •  lactated ringers infusion, 125 mL/hr, Intravenous, Continuous, Doron Beard MD, Stopped at 08/15/17 1500  •  levothyroxine (SYNTHROID, LEVOTHROID) tablet 100 mcg, 100 mcg, Oral, Q AM, Candace Hayden MD  •  losartan (COZAAR) tablet 100 mg, 100 mg, Oral, Nightly, Shantel Coto, [facility], 100 mg at 08/15/17 2018  •  magnesium hydroxide (MILK OF MAGNESIA) suspension 2400 mg/10mL 10 mL, 10 mL, Oral, Daily PRN, Candace Hayden MD  •  morphine injection 4 mg, 4 mg, Intravenous, Q2H PRN **AND** naloxone (NARCAN) injection 0.4 mg, 0.4 mg, Intravenous, Q5 Min PRN, Ramakrishna Waldrop MD  •  ondansetron (ZOFRAN) tablet 4 mg, 4 mg, Oral, Q6H PRN **OR** ondansetron ODT (ZOFRAN-ODT) disintegrating tablet 4 mg, 4 mg, Oral, Q6H PRN **OR** ondansetron (ZOFRAN) injection 4 mg, 4 mg, Intravenous, Q6H PRN, Candace Hayden MD, 4 mg at 08/15/17 1455  •  ondansetron (ZOFRAN) tablet 4 mg, 4 mg, Oral, Q6H PRN **OR** ondansetron ODT (ZOFRAN-ODT) disintegrating tablet 4 mg, 4 mg, Oral, Q6H PRN **OR** ondansetron (ZOFRAN) injection 4 mg, 4 mg, Intravenous, Q6H PRN, Ramakrishna Waldrop MD  •  oxyCODONE-acetaminophen (PERCOCET)  MG per tablet 1 tablet, 1 tablet, Oral, Q4H PRN, Ramakrishna Waldrop MD  •  pneumococcal polysaccharide 23-valent (PNEUMOVAX-23) vaccine 0.5 mL, 0.5 mL, Intramuscular, During Hospitalization, Candace Hayden MD  •  sennosides-docusate sodium (SENOKOT-S) 8.6-50 MG tablet 2 tablet, 2 tablet, Oral, BID PRN, Candace Hayden MD  •  sodium  chloride 0.9 % flush 1-10 mL, 1-10 mL, Intravenous, PRN, Candace Hayden MD  •  spironolactone (ALDACTONE) tablet 25 mg, 25 mg, Oral, Daily, Candace Hayden MD  •  vancomycin (VANCOCIN) 1,250 mg in sodium chloride 0.9 % 250 mL IVPB, 1,250 mg, Intravenous, Q12H, Rolf Schuler, [facility], 1,250 mg at 08/16/17 0517    Assessment/Plan     Active Problems:    Cellulitis and abscess of the left arm    Status post incision and drainage on 08/15/2017     Possible MRSA infection        Patient had incision and drainage of the left arm abscess and cellulitis.  Surgical follow-up is pending.  Continue IV vancomycin.  We will repeat his CBC and CRP in the morning.  If stable patient could be discharged home on oral antibiotics.      Candace Hayden MD  08/16/17  6:56 AM

## 2017-08-17 VITALS
BODY MASS INDEX: 26.68 KG/M2 | HEART RATE: 70 BPM | TEMPERATURE: 98.5 F | DIASTOLIC BLOOD PRESSURE: 80 MMHG | OXYGEN SATURATION: 95 % | HEIGHT: 62 IN | WEIGHT: 145 LBS | RESPIRATION RATE: 18 BRPM | SYSTOLIC BLOOD PRESSURE: 120 MMHG

## 2017-08-17 LAB
BASOPHILS # BLD AUTO: 0.06 10*3/MM3 (ref 0–0.3)
BASOPHILS NFR BLD AUTO: 0.9 % (ref 0–2)
CRP SERPL-MCNC: 3.19 MG/DL (ref 0–0.99)
DEPRECATED RDW RBC AUTO: 39.8 FL (ref 37–54)
EOSINOPHIL # BLD AUTO: 0.3 10*3/MM3 (ref 0–0.7)
EOSINOPHIL NFR BLD AUTO: 4.3 % (ref 0–5)
ERYTHROCYTE [DISTWIDTH] IN BLOOD BY AUTOMATED COUNT: 12.8 % (ref 11.5–14.5)
HCT VFR BLD AUTO: 36.5 % (ref 37–47)
HGB BLD-MCNC: 11.9 G/DL (ref 12–16)
IMM GRANULOCYTES # BLD: 0 10*3/MM3 (ref 0–0.03)
IMM GRANULOCYTES NFR BLD: 0 % (ref 0–0.5)
LYMPHOCYTES # BLD AUTO: 1.77 10*3/MM3 (ref 1–3)
LYMPHOCYTES NFR BLD AUTO: 25.4 % (ref 21–51)
MCH RBC QN AUTO: 28.3 PG (ref 27–33)
MCHC RBC AUTO-ENTMCNC: 32.6 G/DL (ref 33–37)
MCV RBC AUTO: 86.7 FL (ref 80–94)
MONOCYTES # BLD AUTO: 0.43 10*3/MM3 (ref 0.1–0.9)
MONOCYTES NFR BLD AUTO: 6.2 % (ref 0–10)
NEUTROPHILS # BLD AUTO: 4.41 10*3/MM3 (ref 1.4–6.5)
NEUTROPHILS NFR BLD AUTO: 63.2 % (ref 30–70)
PLATELET # BLD AUTO: 300 10*3/MM3 (ref 130–400)
PMV BLD AUTO: 9.9 FL (ref 6–10)
RBC # BLD AUTO: 4.21 10*6/MM3 (ref 4.2–5.4)
WBC NRBC COR # BLD: 6.97 10*3/MM3 (ref 4.5–12.5)

## 2017-08-17 PROCEDURE — 94799 UNLISTED PULMONARY SVC/PX: CPT

## 2017-08-17 PROCEDURE — G0378 HOSPITAL OBSERVATION PER HR: HCPCS

## 2017-08-17 PROCEDURE — 85025 COMPLETE CBC W/AUTO DIFF WBC: CPT | Performed by: INTERNAL MEDICINE

## 2017-08-17 PROCEDURE — 86140 C-REACTIVE PROTEIN: CPT | Performed by: INTERNAL MEDICINE

## 2017-08-17 PROCEDURE — 25010000002 MORPHINE PER 10 MG: Performed by: SURGERY

## 2017-08-17 PROCEDURE — 25010000002 VANCOMYCIN PER 500 MG

## 2017-08-17 RX ORDER — SULFAMETHOXAZOLE AND TRIMETHOPRIM 800; 160 MG/1; MG/1
1 TABLET ORAL EVERY 12 HOURS SCHEDULED
Qty: 14 TABLET | Refills: 0 | Status: SHIPPED | OUTPATIENT
Start: 2017-08-17 | End: 2017-08-24

## 2017-08-17 RX ORDER — OXYCODONE AND ACETAMINOPHEN 10; 325 MG/1; MG/1
1 TABLET ORAL EVERY 4 HOURS PRN
Qty: 20 TABLET | Refills: 0 | Status: SHIPPED | OUTPATIENT
Start: 2017-08-17 | End: 2017-08-25

## 2017-08-17 RX ORDER — SULFAMETHOXAZOLE AND TRIMETHOPRIM 800; 160 MG/1; MG/1
1 TABLET ORAL EVERY 12 HOURS SCHEDULED
Status: DISCONTINUED | OUTPATIENT
Start: 2017-08-17 | End: 2017-08-17 | Stop reason: HOSPADM

## 2017-08-17 RX ADMIN — VANCOMYCIN HYDROCHLORIDE 1500 MG: 5 INJECTION, POWDER, LYOPHILIZED, FOR SOLUTION INTRAVENOUS at 04:55

## 2017-08-17 RX ADMIN — MORPHINE SULFATE 4 MG: 4 INJECTION, SOLUTION INTRAMUSCULAR; INTRAVENOUS at 04:55

## 2017-08-17 RX ADMIN — CYCLOBENZAPRINE HYDROCHLORIDE 10 MG: 10 TABLET, FILM COATED ORAL at 06:22

## 2017-08-17 RX ADMIN — Medication 10 ML: at 04:56

## 2017-08-17 RX ADMIN — BUDESONIDE AND FORMOTEROL FUMARATE DIHYDRATE 2 PUFF: 160; 4.5 AEROSOL RESPIRATORY (INHALATION) at 06:45

## 2017-08-17 RX ADMIN — LEVOTHYROXINE SODIUM 100 MCG: 100 TABLET ORAL at 06:22

## 2017-08-17 NOTE — DISCHARGE SUMMARY
Date of Admission: 8/14/2017  2:30 PM    Date of Discharge:  8/17/2017      Presenting Problem/History of Present Illness  Cellulitis of arm, left [L03.114]  Axillary abscess [L02.419]  Cellulitis of arm, left [L03.114]   Patient is a 57 y.o. female presents with painful swollen area on the left arm for about 5 days.  Symptom is started as redness of the skin on the medial and lower surface of the left arm, progressively area size has increased and she has developed a softer painfull swelling in the middle of that area.  Patient has previous history of MRSA infection on her groin for which she was hospitalized received IV antibiotic and underwent incision and drainage in the remote past.  Today when she was examined in the office her history and examination findings were very much concerning for cellulitis and abscess which was needing incision and drainage and IV antibiotic and patient is admitted to the hospital for further care.        Hospital Course  Patient is a 57 y.o. female presented with abscess and cellulitis of the left arm.  Possible MRSA infection.  Patient was admitted on the medical floor she received IV vancomycin and was seen by Dr. Waldrop and surgery.  She underwent incision and drainage.  Postoperatively she is doing well did not have any fever and her labs are stable, CRP is improving.  Patient is very eager to go home, if it is okay from surgeon I will let her go home.  We will switch her antibiotic to Bactrim DS twice a day for 7 more days.  She should continue to do wound care as recommended by Dr. Waldrop,      Procedures Performed  Procedure(s):  INCISION AND DRAINAGE ABSCESS       Consults:   Consults     Date and Time Order Name Status Description    8/14/2017 1439 Inpatient Consult to General Surgery Completed           Pertinent Test Results:     Lab Results (last 24 hours)     Procedure Component Value Units Date/Time    CBC & Differential [[id number]] Collected:  08/17/17 0049     Specimen:  Blood Updated:  08/17/17 0136    Narrative:       The following orders were created for panel order CBC & Differential.  Procedure                               Abnormality         Status                     ---------                               -----------         ------                     CBC Auto Differential[[id number]]        Abnormal            Final result                 Please view results for these tests on the individual orders.    CBC Auto Differential [[id number]]  (Abnormal) Collected:  08/17/17 0049    Specimen:  Blood Updated:  08/17/17 0136     WBC 6.97 10*3/mm3      RBC 4.21 10*6/mm3      Hemoglobin 11.9 (L) g/dL      Hematocrit 36.5 (L) %      MCV 86.7 fL      MCH 28.3 pg      MCHC 32.6 (L) g/dL      RDW 12.8 %      RDW-SD 39.8 fl      MPV 9.9 fL      Platelets 300 10*3/mm3      Neutrophil % 63.2 %      Lymphocyte % 25.4 %      Monocyte % 6.2 %      Eosinophil % 4.3 %      Basophil % 0.9 %      Immature Grans % 0.0 %      Neutrophils, Absolute 4.41 10*3/mm3      Lymphocytes, Absolute 1.77 10*3/mm3      Monocytes, Absolute 0.43 10*3/mm3      Eosinophils, Absolute 0.30 10*3/mm3      Basophils, Absolute 0.06 10*3/mm3      Immature Grans, Absolute 0.00 10*3/mm3     C-reactive Protein [[id number]]  (Abnormal) Collected:  08/17/17 0050    Specimen:  Blood Updated:  08/17/17 0204     C-Reactive Protein 3.19 (H) mg/dL           Imaging Results (last 7 days)     ** No results found for the last 168 hours. **          ECG/EMG Results (last 72 hours)     ** No results found for the last 72 hours. **            Discharge Diagnosis:   Cellulitis and abscess of the left arm    Status post incision and drainage on 08/15/2017     Possible MRSA infection     Discharge Medications   Manuela Johnson   Home Medication Instructions CARMEN:[id number]    Printed on:08/17/17 0751   Medication Information                      alendronate (FOSAMAX) 70 MG tablet  Take 70 mg by mouth Every 7 (Seven) Days. friday              ASPIR-LOW 81 MG EC tablet  Take 81 mg by mouth daily.             carbidopa-levodopa ER (SINEMET CR)  MG per tablet  Take 2 tablets by mouth Every Night.             cetirizine (zyrTEC) 10 MG tablet  Take 10 mg by mouth Daily.             citalopram (CeleXA) 20 MG tablet  Take 20 mg by mouth Every Evening.             cyclobenzaprine (FLEXERIL) 10 MG tablet  Take 10 mg by mouth 3 (Three) Times a Day.             DULERA 100-5 MCG/ACT inhaler  Inhale 2 puffs 2 (Two) Times a Day.             gabapentin (NEURONTIN) 600 MG tablet  Take 600 mg by mouth 4 (Four) Times a Day As Needed (for moderate pain).             ibuprofen (ADVIL,MOTRIN) 600 MG tablet  Take 600 tablets by mouth 4 (Four) Times a Day As Needed for Mild Pain (1-3).             levothyroxine (SYNTHROID, LEVOTHROID) 100 MCG tablet  Take 100 mcg by mouth daily.             losartan-hydrochlorothiazide (HYZAAR) 100-25 MG per tablet  Take 1 tablet by mouth Every Night.             oxyCODONE-acetaminophen (PERCOCET)  MG per tablet  Take 1 tablet by mouth Every 4 (Four) Hours As Needed for Moderate Pain  for up to 8 days.             spironolactone (ALDACTONE) 25 MG tablet  Take 25 mg by mouth Daily.             sulfamethoxazole-trimethoprim (BACTRIM DS,SEPTRA DS) 800-160 MG per tablet  Take 1 tablet by mouth Every 12 (Twelve) Hours. Indications: Skin and Soft Tissue Infection                 Discharge Diet:   Diet Instructions     Diet: Regular, Consistent Carbohydrate; Thin       Discharge Diet:   Regular  Consistent Carbohydrate      Fluid Consistency:  Thin                 Activity at Discharge:   Activity Instructions     Activity as Tolerated                     Follow-up Appointments  No future appointments.  Additional Instructions for the Follow-ups that You Need to Schedule     Discharge Follow-Up With Specified Provider    As directed    To:  dr hancock   Follow Up:  1 Week       Discharge Follow-up with PCP    As directed    Follow  Up Details:  2 wks                 Test Results Pending at Discharge   Order Current Status    Culture, Routine Preliminary result           Candace Hayden MD  08/17/17  7:51 AM

## 2017-08-17 NOTE — DISCHARGE INSTR - LAB
Please call as soon as possible to schedule an appointment to see Dr. Hayden in 2 weeks.  You can reach the office at .

## 2017-08-17 NOTE — PLAN OF CARE
Problem: Patient Care Overview (Adult)  Goal: Plan of Care Review  Outcome: Ongoing (interventions implemented as appropriate)    08/15/17 0912 08/16/17 0905   Coping/Psychosocial Response Interventions   Plan Of Care Reviewed With --  patient   Patient Care Overview   Progress no change --        Goal: Adult Individualization and Mutuality  Outcome: Ongoing (interventions implemented as appropriate)    Problem: Infection, Risk/Actual (Adult)  Goal: Identify Related Risk Factors and Signs and Symptoms  Outcome: Ongoing (interventions implemented as appropriate)    08/14/17 1511   Infection, Risk/Actual   Infection, Risk/Actual: Related Risk Factors skin integrity impairment   Signs and Symptoms (Infection, Risk/Actual) pain       Goal: Infection Prevention/Resolution  Outcome: Ongoing (interventions implemented as appropriate)    08/17/17 0044   Infection, Risk/Actual (Adult)   Infection Prevention/Resolution making progress toward outcome         Problem: Skin Integrity Impairment, Risk/Actual (Adult)  Goal: Identify Related Risk Factors and Signs and Symptoms  Outcome: Ongoing (interventions implemented as appropriate)    08/14/17 1511   Skin Integrity Impairment, Risk/Actual   Signs and Symptoms (Skin Integrity Impairment) induration;inflammation       Goal: Skin Integrity/Wound Healing  Outcome: Ongoing (interventions implemented as appropriate)    08/17/17 0044   Skin Integrity Impairment, Risk/Actual (Adult)   Skin Integrity/Wound Healing making progress toward outcome         Problem: Pain, Acute (Adult)  Goal: Identify Related Risk Factors and Signs and Symptoms  Outcome: Ongoing (interventions implemented as appropriate)    08/14/17 1511   Pain, Acute   Related Risk Factors (Acute Pain) persistent pain;infection   Signs and Symptoms (Acute Pain) verbalization of pain descriptors       Goal: Acceptable Pain Control/Comfort Level  Outcome: Ongoing (interventions implemented as appropriate)    08/17/17 0044    Pain, Acute (Adult)   Acceptable Pain Control/Comfort Level making progress toward outcome         Problem: Cellulitis (Adult)  Goal: Signs and Symptoms of Listed Potential Problems Will be Absent or Manageable (Cellulitis)  Outcome: Ongoing (interventions implemented as appropriate)    08/14/17 1511   Cellulitis   Problems Assessed (Cellulitis) pain;infection   Problems Present (Cellulitis) pain;infection

## 2017-08-17 NOTE — PAYOR COMM NOTE
"Contact: Natividad Oscar RN @ [facility]  Phone: [phone]  Fax: [phone]      Please look at again for inpatient status.   Patient d/[facility]eron home today 8/17/17.             Manuela De Anda (57 y.o. Female)     Date of Birth Social Security Number Address Home Phone MRN    1960  [address] [facility] [address] [phone] [id number]    Anglican Marital Status          Presybeterian        Admission Date Admission Type Admitting Provider Attending Provider Department, Room/Bed    8/14/17 Elective Candace Hayden MD  [address], [address]/    Discharge Date Discharge Disposition Discharge Destination        8/17/2017 Home or Self Care             Attending Provider: (none)    Allergies:  No Known Allergies    Isolation:  Contact   Infection:  MRSA (08/15/17)   Code Status:  Prior    Ht:  62\" (157.5 cm)   Wt:  145 lb (65.8 kg)    Admission Cmt:  None   Principal Problem:  None                Active Insurance as of 8/14/2017     Primary Coverage     Payor Plan Insurance Group Employer/Plan Group    [employer] [place] [employer] [place]ICA[place]      Payor Plan Address Payor Plan Phone Number Effective From Effective To    [address] [address] [phone] 5/1/2016     [place] [address]       Subscriber Name Subscriber Birth Date Member ID       MANUELA DE ANDA [id number]                 Emergency Contacts      (Rel.) Home Phone Work Phone Mobile Phone    Willian De Anda (Spouse) [phone] -- --               Physician Progress Notes (last 7 days) (Notes from 8/10/2017 12:56 PM through 8/17/2017 12:56 PM)      Candace Hayden MD at 8/15/2017  7:27 AM  Version 1 of 1              LOS: 1 day   Patient Care Team:  Candace Hayden MD as PCP - General  Candace Hayden MD as PCP - Family Medicine    Brief history of present illness:   Patient is a 57 y.o. female presents with painful swollen area on the left arm for about 5 days.  Symptom is started as redness of the skin on " the medial and lower surface of the left arm, progressively area size has increased and she has developed a softer painfull swelling in the middle of that area.  Patient has previous history of MRSA infection on her groin for which she was hospitalized received IV antibiotic and underwent incision and drainage in the remote past.  Today when she was examined in the office her history and examination findings were very much concerning for cellulitis and abscess which was needing incision and drainage and IV antibiotic and patient is admitted to the hospital for further care.         Subjective   Left arm pain and swelling.    History taken from: patient and chart    Interval History:     No significant changes to patients past history, family history, and social history.     Review of Systems:   Review of Systems - General ROS: negative for - fever, malaise, weight gain or weight loss  Psychological ROS: negative for - anxiety, disorientation or hallucinations  Ophthalmic ROS: negative for - blurry vision or double vision  ENT ROS: negative for - hearing change or vertigo  Allergy and Immunology ROS: negative for - hives, insect bite sensitivity or immunodeficiency  Hematological and Lymphatic ROS: negative for - bruising or swollen lymph nodes  Endocrine ROS: negative for - galactorrhea or hair pattern changes, no polyuria or polydipsia.  Respiratory ROS: negative for - hemoptysis or sputum changes, cough or wheezing  Cardiovascular ROS: negative for - loss of consciousness, chest pain or palpitation  Gastrointestinal ROS: negative for - melena or stool incontinence, nausea vomiting , diarrhea or abdominal pain  Genito-Urinary ROS: negative for - genital ulcers or pelvic pain or dysuria  Musculoskeletal ROS: negative for - gait disturbance or muscular weakness  Neurological ROS: negative for - bowel and bladder control changes or seizures, no focal weakness  Dermatological ROS: Positive for redness, pain and  swelling of the left arm.         Physical Exam:    Vital Signs  Temp:  [97.7 °F (36.5 °C)-97.9 °F (36.6 °C)] 97.9 °F (36.6 °C)  Heart Rate:  [85-86] 85  Resp:  [18-20] 18  BP: (119-129)/(69-77) 119/69   General Appearance:     Alert, cooperative, in no acute distress   Head:  Normocephalic, without obvious abnormality, atraumatic   Eyes:          Lids and lashes normal, conjunctivae and sclerae normal, no icterus, no pallor, corneas clear, PERRLA   Ears:  Ears appear intact with no abnormalities noted   Throat: No oral lesions, no thrush, oral mucosa moist   Neck: No adenopathy, supple, trachea midline, no thyromegaly, no carotid bruit, no JVD   Back:   no skin lesions, no erythema or scars, no tenderness to percussion or palpation.     Lungs:   Clear to auscultation,respirations regular, even and               Unlabored, no wheezing     Heart:  Regular rhythm and normal rate, normal S1 and S2, no        murmur, no gallop, no rub, no click   Abdomen:   Normal bowel sounds, no masses, no organomegaly, soft   non-tender, non-distended, no guarding, no rebound   tenderness   Extremities: Moves all extremities well, Left arm has redness, warmth, tenderness with central area of fluctuant swelling .   Pulses: Pulses palpable and equal bilaterally   Skin: No bleeding, bruising or rash   Lymph nodes: No palpable adenopathy   Neurologic: Cranial nerves 2 - 12 grossly intact, sensation intact, overall normal motor strength, DTR present and equal bilaterally        Labs:    Results from last 7 days  Lab Units 08/14/17  1917 08/14/17  1717   CRP mg/dL  --  8.06*   WBC 10*3/mm3 9.11  --    HEMOGLOBIN g/dL 12.3  --    HEMATOCRIT % 36.5*  --    MCV fL 86.3  --    MCHC g/dL 33.7  --    PLATELETS 10*3/mm3 288  --            Results from last 7 days  Lab Units 08/14/17  1717   SODIUM mmol/L 138   POTASSIUM mmol/L 3.7   CHLORIDE mmol/L 105   CO2 mmol/L 30.4   BUN mg/dL 20   CREATININE mg/dL 0.57   EGFR IF NONAFRICN AM mL/min/1.73 109    CALCIUM mg/dL 9.0   GLUCOSE mg/dL 129*   Estimated Creatinine Clearance: 99.9 mL/min (by C-G formula based on Cr of 0.57).  No results found for: AMMONIA  Lab Results   Component Value Date    CKMB <0.2 12/26/2014         No results found for: HGBA1C  Lab Results   Component Value Date    TSH 0.551 01/23/2015     No results found for: PREGTESTUR, PREGSERUM, HCG, HCGQUANT    Pain Management Panel     There is no flowsheet data to display.                Cultures:  @LASTLAB3(BLOODCX:*,URINECX:*,WOUNDCX:*,MRSACX:*,RESPCX:*,STOOLCX:*)@    Lab Results   Component Value Date    CKMB <0.2 12/26/2014             Images:  Imaging Results (last 24 hours)     ** No results found for the last 24 hours. **           Active Meds      Current Facility-Administered Medications:   •  acetaminophen (TYLENOL) tablet 650 mg, 650 mg, Oral, Q4H PRN, Candace Hayden MD  •  aspirin EC tablet 81 mg, 81 mg, Oral, Daily, Candace Hayden MD  •  bisacodyl (DULCOLAX) EC tablet 10 mg, 10 mg, Oral, Daily PRN, Candace Hayden MD  •  bisacodyl (DULCOLAX) suppository 10 mg, 10 mg, Rectal, Daily PRN, Candace Hayden MD  •  budesonide-formoterol (SYMBICORT) 160-4.5 MCG/ACT inhaler 2 puff, 2 puff, Inhalation, BID - RT, Candace Hayden MD  •  carbidopa-levodopa (SINEMET)  MG per tablet 2 tablet, 2 tablet, Oral, Nightly, Candace Hayden MD, 2 tablet at 08/14/17 2034  •  cetirizine (zyrTEC) tablet 10 mg, 10 mg, Oral, Daily, Candace Hayden MD  •  citalopram (CeleXA) tablet 20 mg, 20 mg, Oral, Nightly, Candace Hayden MD, 20 mg at 08/14/17 2034  •  cyclobenzaprine (FLEXERIL) tablet 10 mg, 10 mg, Oral, Q8H, Candace Hayden MD, 10 mg at 08/14/17 2034  •  enoxaparin (LOVENOX) syringe 40 mg, 40 mg, Subcutaneous, Nightly, Candace Hayden MD  •  gabapentin (NEURONTIN) capsule 600 mg, 600 mg, Oral, Q6H PRN, Candace Hayden MD  •  hydrochlorothiazide (HYDRODIURIL) tablet 25 mg, 25 mg, Oral, Nightly, Shantel Coto, [facility], 25 mg at 08/14/17 2034  •  ibuprofen  (ADVIL,MOTRIN) tablet 600 mg, 600 mg, Oral, Q6H PRN, Candace Hayden MD  •  levothyroxine (SYNTHROID, LEVOTHROID) tablet 100 mcg, 100 mcg, Oral, Q AM, Candace Hayden MD  •  losartan (COZAAR) tablet 100 mg, 100 mg, Oral, Nightly, Shantel Coto, [facility]  •  magnesium hydroxide (MILK OF MAGNESIA) suspension 2400 mg/10mL 10 mL, 10 mL, Oral, Daily PRN, Candace Haydne MD  •  ondansetron (ZOFRAN) tablet 4 mg, 4 mg, Oral, Q6H PRN **OR** ondansetron ODT (ZOFRAN-ODT) disintegrating tablet 4 mg, 4 mg, Oral, Q6H PRN **OR** ondansetron (ZOFRAN) injection 4 mg, 4 mg, Intravenous, Q6H PRN, Candace Hayden MD  •  pneumococcal polysaccharide 23-valent (PNEUMOVAX-23) vaccine 0.5 mL, 0.5 mL, Intramuscular, During Hospitalization, Candace Hayden MD  •  sennosides-docusate sodium (SENOKOT-S) 8.6-50 MG tablet 2 tablet, 2 tablet, Oral, BID PRN, Candace Hayden MD  •  sodium chloride 0.9 % flush 1-10 mL, 1-10 mL, Intravenous, PRN, Candace Hayden MD  •  spironolactone (ALDACTONE) tablet 25 mg, 25 mg, Oral, Daily, Candace Hayden MD  •  vancomycin (VANCOCIN) 1,250 mg in sodium chloride 0.9 % 250 mL IVPB, 1,250 mg, Intravenous, Q12H, Rolf Schuler, [facility], 1,250 mg at 08/15/17 0411  •  Vancomycin Pharmacy Intermittent Dosing, , Does not apply, Daily, Candace Hayden MD    Assessment/Plan     Active Problems:    Cellulitis and abscess of the left arm     Possible MRSA infection        The patient will be undergoing incision and drainage today.  Continue IV vancomycin.        Candace Hayden MD  08/15/17  7:28 AM           Electronically signed by Candace Hayden MD at 8/15/2017  7:30 AM      Candace Hayden MD at 8/16/2017  6:56 AM  Version 1 of 1              LOS: 2 days   Patient Care Team:  Candace Hayden MD as PCP - General  Candace Hayden MD as PCP - Family Medicine    Brief history of present illness:   Patient is a 57 y.o. female presents with painful swollen area on the left arm for about 5 days.  Symptom is started as  redness of the skin on the medial and lower surface of the left arm, progressively area size has increased and she has developed a softer painfull swelling in the middle of that area.  Patient has previous history of MRSA infection on her groin for which she was hospitalized received IV antibiotic and underwent incision and drainage in the remote past.  Today when she was examined in the office her history and examination findings were very much concerning for cellulitis and abscess which was needing incision and drainage and IV antibiotic and patient is admitted to the hospital for further care.         Subjective   Patient had incision and drainage of the left arm abscess.  She is feeling much better.    History taken from: patient and chart    Interval History:     No significant changes to patients past history, family history, and social history.     Review of Systems:   Review of Systems - General ROS: negative for - fever, malaise, weight gain or weight loss  Psychological ROS: negative for - anxiety, disorientation or hallucinations  Ophthalmic ROS: negative for - blurry vision or double vision  ENT ROS: negative for - hearing change or vertigo  Allergy and Immunology ROS: negative for - hives, insect bite sensitivity or immunodeficiency  Hematological and Lymphatic ROS: negative for - bruising or swollen lymph nodes  Endocrine ROS: negative for - galactorrhea or hair pattern changes, no polyuria or polydipsia.  Respiratory ROS: negative for - hemoptysis or sputum changes, cough or wheezing  Cardiovascular ROS: negative for - loss of consciousness, chest pain or palpitation  Gastrointestinal ROS: negative for - melena or stool incontinence, nausea vomiting , diarrhea or abdominal pain  Genito-Urinary ROS: negative for - genital ulcers or pelvic pain or dysuria  Musculoskeletal ROS: negative for - gait disturbance or muscular weakness  Neurological ROS: negative for - bowel and bladder control changes or  seizures, no focal weakness  Dermatological ROS: Positive for redness, pain and swelling of the left arm s/p incision and drainage         Physical Exam:    Vital Signs  Temp:  [97.1 °F (36.2 °C)-98.1 °F (36.7 °C)] 97.9 °F (36.6 °C)  Heart Rate:  [64-83] 64  Resp:  [10-20] 18  BP: (110-160)/(62-97) 110/62   General Appearance:     Alert, cooperative, in no acute distress   Head:  Normocephalic, without obvious abnormality, atraumatic   Eyes:          Lids and lashes normal, conjunctivae and sclerae normal, no icterus, no pallor, corneas clear, PERRLA   Ears:  Ears appear intact with no abnormalities noted   Throat: No oral lesions, no thrush, oral mucosa moist   Neck: No adenopathy, supple, trachea midline, no thyromegaly, no carotid bruit, no JVD   Back:   no skin lesions, no erythema or scars, no tenderness to percussion or palpation.     Lungs:   Clear to auscultation,respirations regular, even and               Unlabored, no wheezing     Heart:  Regular rhythm and normal rate, normal S1 and S2, no        murmur, no gallop, no rub, no click   Abdomen:   Normal bowel sounds, no masses, no organomegaly, soft   non-tender, non-distended, no guarding, no rebound   tenderness   Extremities: Moves all extremities well, Left arm has Dressings , status post incision and drainage    Pulses: Pulses palpable and equal bilaterally   Skin: No bleeding, bruising or rash   Lymph nodes: No palpable adenopathy   Neurologic: Cranial nerves 2 - 12 grossly intact, sensation intact, overall normal motor strength, DTR present and equal bilaterally        Labs:    Results from last 7 days  Lab Units 08/14/17  1917 08/14/17  1717   CRP mg/dL  --  8.06*   WBC 10*3/mm3 9.11  --    HEMOGLOBIN g/dL 12.3  --    HEMATOCRIT % 36.5*  --    MCV fL 86.3  --    MCHC g/dL 33.7  --    PLATELETS 10*3/mm3 288  --            Results from last 7 days  Lab Units 08/14/17  1717   SODIUM mmol/L 138   POTASSIUM mmol/L 3.7   CHLORIDE mmol/L 105   CO2 mmol/L  30.4   BUN mg/dL 20   CREATININE mg/dL 0.57   EGFR IF NONAFRICN AM mL/min/1.73 109   CALCIUM mg/dL 9.0   GLUCOSE mg/dL 129*   Estimated Creatinine Clearance: 97 mL/min (by C-G formula based on Cr of 0.57).  No results found for: AMMONIA  Lab Results   Component Value Date    CKMB <0.2 12/26/2014         No results found for: HGBA1C  Lab Results   Component Value Date    TSH 0.551 01/23/2015     No results found for: PREGTESTUR, PREGSERUM, HCG, HCGQUANT    Pain Management Panel     There is no flowsheet data to display.                Cultures:  @LASTLAB3(BLOODCX:*,URINECX:*,WOUNDCX:*,MRSACX:*,RESPCX:*,STOOLCX:*)@    Lab Results   Component Value Date    CKMB <0.2 12/26/2014             Images:  Imaging Results (last 24 hours)     ** No results found for the last 24 hours. **           Active Meds      Current Facility-Administered Medications:   •  acetaminophen (TYLENOL) tablet 650 mg, 650 mg, Oral, Q4H PRN, Candace Hayden MD  •  aspirin EC tablet 81 mg, 81 mg, Oral, Daily, Candace Hayden MD  •  bisacodyl (DULCOLAX) EC tablet 10 mg, 10 mg, Oral, Daily PRN, Candace Hayden MD  •  bisacodyl (DULCOLAX) suppository 10 mg, 10 mg, Rectal, Daily PRN, Candace Hayden MD  •  budesonide-formoterol (SYMBICORT) 160-4.5 MCG/ACT inhaler 2 puff, 2 puff, Inhalation, BID - RT, Candace Hayden MD, 2 puff at 08/15/17 1934  •  carbidopa-levodopa (SINEMET)  MG per tablet 2 tablet, 2 tablet, Oral, Nightly, Candace Hayden MD, 2 tablet at 08/15/17 2019  •  cetirizine (zyrTEC) tablet 10 mg, 10 mg, Oral, Daily, Candace Hayden MD  •  citalopram (CeleXA) tablet 20 mg, 20 mg, Oral, Nightly, Candace Hayden MD, 20 mg at 08/15/17 2019  •  cyclobenzaprine (FLEXERIL) tablet 10 mg, 10 mg, Oral, Q8H, Candace Hayden MD, 10 mg at 08/16/17 0517  •  enoxaparin (LOVENOX) syringe 40 mg, 40 mg, Subcutaneous, Daily, Ramakrishna Waldrop MD  •  gabapentin (NEURONTIN) capsule 600 mg, 600 mg, Oral, Q6H PRN, Candace Hadyen MD, 600 mg at 08/15/17 1241  •   hydrochlorothiazide (HYDRODIURIL) tablet 25 mg, 25 mg, Oral, Nightly, Shantel YURI Nnamdi, [facility], 25 mg at 08/15/17 2019  •  ibuprofen (ADVIL,MOTRIN) tablet 600 mg, 600 mg, Oral, Q6H PRN, Candace Hayden MD  •  lactated ringers infusion, 125 mL/hr, Intravenous, Continuous, Doron Beard MD, Stopped at 08/15/17 1500  •  levothyroxine (SYNTHROID, LEVOTHROID) tablet 100 mcg, 100 mcg, Oral, Q AM, Candace Hayden MD  •  losartan (COZAAR) tablet 100 mg, 100 mg, Oral, Nightly, Shantel Coto, [facility], 100 mg at 08/15/17 2018  •  magnesium hydroxide (MILK OF MAGNESIA) suspension 2400 mg/10mL 10 mL, 10 mL, Oral, Daily PRN, Candace Hayden MD  •  morphine injection 4 mg, 4 mg, Intravenous, Q2H PRN **AND** naloxone (NARCAN) injection 0.4 mg, 0.4 mg, Intravenous, Q5 Min PRN, Ramakrishna Waldrop MD  •  ondansetron (ZOFRAN) tablet 4 mg, 4 mg, Oral, Q6H PRN **OR** ondansetron ODT (ZOFRAN-ODT) disintegrating tablet 4 mg, 4 mg, Oral, Q6H PRN **OR** ondansetron (ZOFRAN) injection 4 mg, 4 mg, Intravenous, Q6H PRN, Candace Hayden MD, 4 mg at 08/15/17 1455  •  ondansetron (ZOFRAN) tablet 4 mg, 4 mg, Oral, Q6H PRN **OR** ondansetron ODT (ZOFRAN-ODT) disintegrating tablet 4 mg, 4 mg, Oral, Q6H PRN **OR** ondansetron (ZOFRAN) injection 4 mg, 4 mg, Intravenous, Q6H PRN, Ramakrishna Waldrop MD  •  oxyCODONE-acetaminophen (PERCOCET)  MG per tablet 1 tablet, 1 tablet, Oral, Q4H PRN, Ramakrishna Waldrop MD  •  pneumococcal polysaccharide 23-valent (PNEUMOVAX-23) vaccine 0.5 mL, 0.5 mL, Intramuscular, During Hospitalization, Candace Hayden MD  •  sennosides-docusate sodium (SENOKOT-S) 8.6-50 MG tablet 2 tablet, 2 tablet, Oral, BID PRN, Candace Hayden MD  •  sodium chloride 0.9 % flush 1-10 mL, 1-10 mL, Intravenous, PRN, Candace Hayden MD  •  spironolactone (ALDACTONE) tablet 25 mg, 25 mg, Oral, Daily, Candace Hayden MD  •  vancomycin (VANCOCIN) 1,250 mg in sodium chloride 0.9 % 250 mL IVPB, 1,250 mg, Intravenous, Q12H, Rolf Schuler [facility],  1,250 mg at 08/16/17 0517    Assessment/Plan     Active Problems:    Cellulitis and abscess of the left arm    Status post incision and drainage on 08/15/2017     Possible MRSA infection        Patient had incision and drainage of the left arm abscess and cellulitis.  Surgical follow-up is pending.  Continue IV vancomycin.  We will repeat his CBC and CRP in the morning.  If stable patient could be discharged home on oral antibiotics.      Candace Hayden MD  08/16/17  6:56 AM           Electronically signed by Candace Hayden MD at 8/16/2017  6:58 AM      Ramakrishna Waldrop MD at 8/16/2017  2:29 PM  Version 1 of 1         Postoperative day #1 incision and drainage abscess left arm.    Afebrile vital signs stable.    Dressings in place.    Patient to have normal saline wet-to-dry dressings daily.    From my point of view okay for her to be discharged tomorrow.  I will follow her wound in the office.  At discharge please make an appointment for her to see me in one week and continue normal saline wet-to-dry dressings daily.     Electronically signed by Ramakrishna Waldrop MD at 8/16/2017  2:30 PM           Consult Notes (last 7 days) (Notes from 08/10/17 through 08/17/17)      Ramakrishna Waldrop MD at 8/14/2017  4:36 PM  Version 1 of 1     Consult Orders:    1. Inpatient Consult to General Surgery [[id number]] ordered by Candace Hayden MD at 08/14/17 1201                Consultation note    Referring physician: Candace Hayden MD    Consulting Physician: Dr. Ramakrishna Waldrop MD    Reason for consultation: abscess left arm    No chief complaint on file.      HPI:   The patient is a very pleasant 57 y.o. years old female admitted to the hospital by Dr. Woodward for an abscess or left arm.  This is been present for a few days.  She's had to have an abscess drain in the right axilla in the past.  No other modifying factors or associated symptoms    Past Medical History:   Diagnosis Date   • Arthritis    • Cancer     melanoma   • Coronary  artery disease    • Hypertension    • Hypothyroid    • Sleep apnea        Past Surgical History:   Procedure Laterality Date   • ESSURE TUBAL LIGATION     • INCISION AND DRAINAGE ABSCESS           Current Facility-Administered Medications:   •  acetaminophen (TYLENOL) tablet 650 mg, 650 mg, Oral, Q4H PRN, Candace Hayden MD  •  bisacodyl (DULCOLAX) EC tablet 10 mg, 10 mg, Oral, Daily PRN, Candace Hayden MD  •  bisacodyl (DULCOLAX) suppository 10 mg, 10 mg, Rectal, Daily PRN, Candace Hayden MD  •  enoxaparin (LOVENOX) syringe 40 mg, 40 mg, Subcutaneous, Nightly, Candace Hayden MD  •  magnesium hydroxide (MILK OF MAGNESIA) suspension 2400 mg/10mL 10 mL, 10 mL, Oral, Daily PRN, Candace Hayden MD  •  ondansetron (ZOFRAN) tablet 4 mg, 4 mg, Oral, Q6H PRN **OR** ondansetron ODT (ZOFRAN-ODT) disintegrating tablet 4 mg, 4 mg, Oral, Q6H PRN **OR** ondansetron (ZOFRAN) injection 4 mg, 4 mg, Intravenous, Q6H PRN, Candace Hayden MD  •  pneumococcal polysaccharide 23-valent (PNEUMOVAX-23) vaccine 0.5 mL, 0.5 mL, Intramuscular, During Hospitalization, Candace Hayden MD  •  sennosides-docusate sodium (SENOKOT-S) 8.6-50 MG tablet 2 tablet, 2 tablet, Oral, BID PRN, Candace Hayden MD  •  sodium chloride 0.9 % flush 1-10 mL, 1-10 mL, Intravenous, PRN, Candace Hayden MD  •  vancomycin (VANCOCIN) 1,500 mg in sodium chloride 0.9 % 500 mL IVPB, 1,500 mg, Intravenous, Once, Candace Hayden MD    No Known Allergies    Social History     Social History   • Marital status:      Spouse name: N/A   • Number of children: N/A   • Years of education: N/A     Occupational History   • Not on file.     Social History Main Topics   • Smoking status: Current Every Day Smoker     Packs/day: 1.00     Years: 47.00   • Smokeless tobacco: Not on file   • Alcohol use No   • Drug use: No   • Sexual activity: Not on file     Other Topics Concern   • Not on file     Social History Narrative       Family History   Problem Relation Age of Onset   •  Hypertension Father    • Cancer Father    • Hypertension Brother    • Cancer Brother        ROS:   Constitutional: denies any weight changes, fatigue or weakness.  Eyes: : denies blurred or double vision  Cardiovascular: denies chest pain, palpitations, edemas.  Respiratory: denies cough, sputum, SOB.  Gastrointestinal: denies N&V, abd pain, diarrhea, constipation.  Genitourinary: denies dysuria, frequency.  Endocrine: denies cold intolerance, lethargy and flushing.  Hem: denies excessive bruising and postop bleeding.  Musculoskeletal: denies weakness, joint swelling, pain or stiffness.  Neuro: denies seizures, CVA, paresthesia, or peripheral neuropathy.   Skin: denies change in nevi, rashes, masses.        Physical Exam:       General Appearance:    Alert, cooperative, in no acute distress   Head:    Normocephalic, without obvious abnormality, atraumatic   Eyes:            Lids and lashes normal, conjunctivae and sclerae normal, no   icterus, no pallor, corneas clear, PERRLA   Ears:    Ears appear intact with no abnormalities noted   Throat:   No oral lesions, no thrush, oral mucosa moist   Neck:   No adenopathy, supple, trachea midline, no thyromegaly, no   carotid bruit, no JVD   Back:     No kyphosis present, no scoliosis present, no skin lesions,      erythema or scars, no tenderness to percussion or                   palpation,   range of motion normal   Lungs:     Clear to auscultation,respirations regular, even and                  unlabored    Heart:    Regular rhythm and normal rate, normal S1 and S2, no            murmur, no gallop, no rub, no click   Chest Wall:    No abnormalities observed   Abdomen:    Soft nontender    Rectal:     Deferred   Extremities:   Moves all extremities well, no edema, no cyanosis, no             redness   Pulses:   Pulses palpable and equal bilaterally   Skin:   Left upper arm just distal to the axilla there is a 3 cm abscess with surrounding cellulitis    Lymph nodes:   No  palpable adenopathy   Neurologic:   Cranial nerves 2 - 12 grossly intact, sensation intact, DTR       present and equal bilaterally     Vitals:    08/14/17 1456   BP: 129/77   Pulse: 86   Resp: 20   Temp: 97.7 °F (36.5 °C)   SpO2: 93%       Lab Results   Component Value Date    GLUCOSE 134 (H) 05/19/2016    BUN 14 05/19/2016    CREATININE 0.62 05/19/2016    BCR 21.8 05/19/2016    CO2 21.4 (L) 05/19/2016    CALCIUM 8.5 05/19/2016    ALBUMIN 4.6 01/23/2015    LABIL2 1.5 01/23/2015    AST 18 01/23/2015    ALT 16 01/23/2015     No results found for: WBC, RBC, HGB, HCT, MCV, MCH, MCHC, RDW, RDWSD, MPV, PLT, NEUTRORELPCT, LYMPHORELPCT, MONORELPCT, EOSRELPCT, BASORELPCT, AUTOIGPER, NEUTROABS, LYMPHSABS, MONOSABS, EOSABS, BASOSABS, AUTOIGNUM, NRBC    Imaging Results (last 72 hours)     ** No results found for the last 72 hours. **            Assessment:   Abscess left arm    Plan:  Incision and drainage in the operating room.  Unfortunately the patient has are today so we'll have to do it tomorrow morning.      Dragon disclaimer:  Much of this encounter note is an electronic transcription/translation of spoken language to printed text. The electronic translation of spoken language may permit erroneous, or at times, nonsensical words or phrases to be inadvertently transcribed; Although I have reviewed the note for such errors, some may still exist.     Electronically signed by Ramakrishna Waldrop MD at 8/14/2017  4:38 PM

## 2017-08-18 LAB
BACTERIA SPEC AEROBE CULT: ABNORMAL
GRAM STN SPEC: ABNORMAL
GRAM STN SPEC: ABNORMAL

## 2017-08-22 ENCOUNTER — TELEPHONE (OUTPATIENT)
Dept: SURGERY | Facility: CLINIC | Age: 57
End: 2017-08-22

## 2017-08-22 NOTE — TELEPHONE ENCOUNTER
L/M for Manuela to call back to see if she wanted to r/s her apt on 08/29/2017 to Friday 08/25/2017

## 2018-09-28 ENCOUNTER — HOSPITAL ENCOUNTER (EMERGENCY)
Facility: HOSPITAL | Age: 58
Discharge: HOME | End: 2018-09-28
Attending: EMERGENCY MEDICINE | Admitting: INTERNAL MEDICINE

## 2018-09-28 ENCOUNTER — APPOINTMENT (OUTPATIENT)
Dept: GENERAL RADIOLOGY | Facility: HOSPITAL | Age: 58
End: 2018-09-28

## 2018-09-28 VITALS
SYSTOLIC BLOOD PRESSURE: 128 MMHG | BODY MASS INDEX: 25.34 KG/M2 | RESPIRATION RATE: 18 BRPM | HEART RATE: 82 BPM | WEIGHT: 143 LBS | DIASTOLIC BLOOD PRESSURE: 78 MMHG | TEMPERATURE: 97.4 F | OXYGEN SATURATION: 99 % | HEIGHT: 63 IN

## 2018-09-28 DIAGNOSIS — J40 BRONCHITIS: Primary | ICD-10-CM

## 2018-09-28 LAB
ALBUMIN SERPL-MCNC: 4.6 G/DL (ref 3.5–5)
ALBUMIN/GLOB SERPL: 1.6 G/DL (ref 1.5–2.5)
ALP SERPL-CCNC: 68 U/L (ref 35–104)
ALT SERPL W P-5'-P-CCNC: 7 U/L (ref 10–36)
ANION GAP SERPL CALCULATED.3IONS-SCNC: 4.6 MMOL/L (ref 3.6–11.2)
AST SERPL-CCNC: 18 U/L (ref 10–30)
BASOPHILS # BLD AUTO: 0.02 10*3/MM3 (ref 0–0.3)
BASOPHILS NFR BLD AUTO: 0.3 % (ref 0–2)
BILIRUB SERPL-MCNC: 0.6 MG/DL (ref 0.2–1.8)
BUN BLD-MCNC: 17 MG/DL (ref 7–21)
BUN/CREAT SERPL: 27 (ref 7–25)
CALCIUM SPEC-SCNC: 9.3 MG/DL (ref 7.7–10)
CHLORIDE SERPL-SCNC: 103 MMOL/L (ref 99–112)
CO2 SERPL-SCNC: 27.4 MMOL/L (ref 24.3–31.9)
CREAT BLD-MCNC: 0.63 MG/DL (ref 0.43–1.29)
DEPRECATED RDW RBC AUTO: 41 FL (ref 37–54)
EOSINOPHIL # BLD AUTO: 0.16 10*3/MM3 (ref 0–0.7)
EOSINOPHIL NFR BLD AUTO: 2 % (ref 0–5)
ERYTHROCYTE [DISTWIDTH] IN BLOOD BY AUTOMATED COUNT: 13.4 % (ref 11.5–14.5)
FLUAV AG NPH QL: NEGATIVE
FLUBV AG NPH QL IA: NEGATIVE
GFR SERPL CREATININE-BSD FRML MDRD: 97 ML/MIN/1.73
GLOBULIN UR ELPH-MCNC: 2.8 GM/DL
GLUCOSE BLD-MCNC: 109 MG/DL (ref 70–110)
HCT VFR BLD AUTO: 40.6 % (ref 37–47)
HGB BLD-MCNC: 13.8 G/DL (ref 12–16)
IMM GRANULOCYTES # BLD: 0.02 10*3/MM3 (ref 0–0.03)
IMM GRANULOCYTES NFR BLD: 0.3 % (ref 0–0.5)
LYMPHOCYTES # BLD AUTO: 2.22 10*3/MM3 (ref 1–3)
LYMPHOCYTES NFR BLD AUTO: 28.3 % (ref 21–51)
MCH RBC QN AUTO: 28.8 PG (ref 27–33)
MCHC RBC AUTO-ENTMCNC: 34 G/DL (ref 33–37)
MCV RBC AUTO: 84.8 FL (ref 80–94)
MONOCYTES # BLD AUTO: 0.35 10*3/MM3 (ref 0.1–0.9)
MONOCYTES NFR BLD AUTO: 4.5 % (ref 0–10)
NEUTROPHILS # BLD AUTO: 5.07 10*3/MM3 (ref 1.4–6.5)
NEUTROPHILS NFR BLD AUTO: 64.6 % (ref 30–70)
OSMOLALITY SERPL CALC.SUM OF ELEC: 272.2 MOSM/KG (ref 273–305)
PLATELET # BLD AUTO: 260 10*3/MM3 (ref 130–400)
PMV BLD AUTO: 10.1 FL (ref 6–10)
POTASSIUM BLD-SCNC: 3.6 MMOL/L (ref 3.5–5.3)
PROT SERPL-MCNC: 7.4 G/DL (ref 6–8)
RBC # BLD AUTO: 4.79 10*6/MM3 (ref 4.2–5.4)
SODIUM BLD-SCNC: 135 MMOL/L (ref 135–153)
TROPONIN I SERPL-MCNC: <0.006 NG/ML
WBC NRBC COR # BLD: 7.84 10*3/MM3 (ref 4.5–12.5)

## 2018-09-28 PROCEDURE — 71046 X-RAY EXAM CHEST 2 VIEWS: CPT | Performed by: RADIOLOGY

## 2018-09-28 PROCEDURE — 99283 EMERGENCY DEPT VISIT LOW MDM: CPT

## 2018-09-28 PROCEDURE — 85025 COMPLETE CBC W/AUTO DIFF WBC: CPT | Performed by: PHYSICIAN ASSISTANT

## 2018-09-28 PROCEDURE — 80053 COMPREHEN METABOLIC PANEL: CPT | Performed by: PHYSICIAN ASSISTANT

## 2018-09-28 PROCEDURE — 93005 ELECTROCARDIOGRAM TRACING: CPT | Performed by: PHYSICIAN ASSISTANT

## 2018-09-28 PROCEDURE — 94640 AIRWAY INHALATION TREATMENT: CPT

## 2018-09-28 PROCEDURE — 96374 THER/PROPH/DIAG INJ IV PUSH: CPT

## 2018-09-28 PROCEDURE — 87804 INFLUENZA ASSAY W/OPTIC: CPT | Performed by: PHYSICIAN ASSISTANT

## 2018-09-28 PROCEDURE — 71046 X-RAY EXAM CHEST 2 VIEWS: CPT

## 2018-09-28 PROCEDURE — 84484 ASSAY OF TROPONIN QUANT: CPT | Performed by: PHYSICIAN ASSISTANT

## 2018-09-28 PROCEDURE — 87040 BLOOD CULTURE FOR BACTERIA: CPT | Performed by: PHYSICIAN ASSISTANT

## 2018-09-28 PROCEDURE — 94799 UNLISTED PULMONARY SVC/PX: CPT

## 2018-09-28 PROCEDURE — 25010000002 METHYLPREDNISOLONE PER 125 MG: Performed by: PHYSICIAN ASSISTANT

## 2018-09-28 PROCEDURE — 93010 ELECTROCARDIOGRAM REPORT: CPT | Performed by: INTERNAL MEDICINE

## 2018-09-28 RX ORDER — ALBUTEROL SULFATE 2.5 MG/3ML
2.5 SOLUTION RESPIRATORY (INHALATION) ONCE
Status: COMPLETED | OUTPATIENT
Start: 2018-09-28 | End: 2018-09-28

## 2018-09-28 RX ORDER — METHYLPREDNISOLONE SODIUM SUCCINATE 125 MG/2ML
125 INJECTION, POWDER, LYOPHILIZED, FOR SOLUTION INTRAMUSCULAR; INTRAVENOUS ONCE
Status: COMPLETED | OUTPATIENT
Start: 2018-09-28 | End: 2018-09-28

## 2018-09-28 RX ORDER — SODIUM CHLORIDE 0.9 % (FLUSH) 0.9 %
10 SYRINGE (ML) INJECTION AS NEEDED
Status: DISCONTINUED | OUTPATIENT
Start: 2018-09-28 | End: 2018-09-28 | Stop reason: HOSPADM

## 2018-09-28 RX ORDER — DOXYCYCLINE 100 MG/1
100 CAPSULE ORAL 2 TIMES DAILY
Qty: 20 CAPSULE | Refills: 0 | OUTPATIENT
Start: 2018-09-28 | End: 2019-03-30

## 2018-09-28 RX ADMIN — ALBUTEROL SULFATE 2.5 MG: 2.5 SOLUTION RESPIRATORY (INHALATION) at 13:43

## 2018-09-28 RX ADMIN — METHYLPREDNISOLONE SODIUM SUCCINATE 125 MG: 125 INJECTION, POWDER, FOR SOLUTION INTRAMUSCULAR; INTRAVENOUS at 13:55

## 2018-10-03 LAB
BACTERIA SPEC AEROBE CULT: NORMAL
BACTERIA SPEC AEROBE CULT: NORMAL

## 2019-01-31 ENCOUNTER — APPOINTMENT (OUTPATIENT)
Dept: MAMMOGRAPHY | Facility: HOSPITAL | Age: 59
End: 2019-01-31

## 2019-02-14 ENCOUNTER — HOSPITAL ENCOUNTER (OUTPATIENT)
Dept: MAMMOGRAPHY | Facility: HOSPITAL | Age: 59
Discharge: HOME | End: 2019-02-14
Admitting: INTERNAL MEDICINE

## 2019-02-14 DIAGNOSIS — Z12.39 SCREENING BREAST EXAMINATION: ICD-10-CM

## 2019-02-14 PROCEDURE — 77063 BREAST TOMOSYNTHESIS BI: CPT | Performed by: RADIOLOGY

## 2019-02-14 PROCEDURE — 77067 SCR MAMMO BI INCL CAD: CPT

## 2019-02-14 PROCEDURE — 77063 BREAST TOMOSYNTHESIS BI: CPT

## 2019-02-14 PROCEDURE — 77067 SCR MAMMO BI INCL CAD: CPT | Performed by: RADIOLOGY

## 2019-03-06 ENCOUNTER — APPOINTMENT (OUTPATIENT)
Dept: MAMMOGRAPHY | Facility: HOSPITAL | Age: 59
End: 2019-03-06

## 2019-03-30 ENCOUNTER — APPOINTMENT (OUTPATIENT)
Dept: CT IMAGING | Facility: HOSPITAL | Age: 59
End: 2019-03-30

## 2019-03-30 ENCOUNTER — APPOINTMENT (OUTPATIENT)
Dept: ULTRASOUND IMAGING | Facility: HOSPITAL | Age: 59
End: 2019-03-30

## 2019-03-30 ENCOUNTER — HOSPITAL ENCOUNTER (EMERGENCY)
Facility: HOSPITAL | Age: 59
Discharge: HOME | End: 2019-03-30
Attending: EMERGENCY MEDICINE | Admitting: EMERGENCY MEDICINE

## 2019-03-30 VITALS
DIASTOLIC BLOOD PRESSURE: 77 MMHG | SYSTOLIC BLOOD PRESSURE: 136 MMHG | TEMPERATURE: 99.3 F | WEIGHT: 140 LBS | BODY MASS INDEX: 24.8 KG/M2 | HEIGHT: 63 IN | OXYGEN SATURATION: 96 % | HEART RATE: 92 BPM | RESPIRATION RATE: 18 BRPM

## 2019-03-30 DIAGNOSIS — K80.20 CALCULUS OF GALLBLADDER WITHOUT CHOLECYSTITIS WITHOUT OBSTRUCTION: ICD-10-CM

## 2019-03-30 DIAGNOSIS — N12 PYELONEPHRITIS: Primary | ICD-10-CM

## 2019-03-30 LAB
ALBUMIN SERPL-MCNC: 4.1 G/DL (ref 3.5–5.2)
ALBUMIN/GLOB SERPL: 1.1 G/DL
ALP SERPL-CCNC: 79 U/L (ref 39–117)
ALT SERPL W P-5'-P-CCNC: 11 U/L (ref 1–33)
AMYLASE SERPL-CCNC: 25 U/L (ref 28–100)
ANION GAP SERPL CALCULATED.3IONS-SCNC: 17.6 MMOL/L
AST SERPL-CCNC: 16 U/L (ref 1–32)
BACTERIA UR QL AUTO: ABNORMAL /HPF
BASOPHILS # BLD AUTO: 0.02 10*3/MM3 (ref 0–0.2)
BASOPHILS NFR BLD AUTO: 0.1 % (ref 0–1.5)
BILIRUB SERPL-MCNC: 0.5 MG/DL (ref 0.2–1.2)
BILIRUB UR QL STRIP: NEGATIVE
BUN BLD-MCNC: 20 MG/DL (ref 6–20)
BUN/CREAT SERPL: 23.3 (ref 7–25)
CALCIUM SPEC-SCNC: 9.4 MG/DL (ref 8.6–10.5)
CHLORIDE SERPL-SCNC: 91 MMOL/L (ref 98–107)
CLARITY UR: ABNORMAL
CO2 SERPL-SCNC: 24.4 MMOL/L (ref 22–29)
COLOR UR: ABNORMAL
CREAT BLD-MCNC: 0.86 MG/DL (ref 0.57–1)
CRP SERPL-MCNC: 28.46 MG/DL (ref 0–0.5)
DEPRECATED RDW RBC AUTO: 38.8 FL (ref 37–54)
EOSINOPHIL # BLD AUTO: 0.01 10*3/MM3 (ref 0–0.4)
EOSINOPHIL NFR BLD AUTO: 0.1 % (ref 0.3–6.2)
ERYTHROCYTE [DISTWIDTH] IN BLOOD BY AUTOMATED COUNT: 13 % (ref 12.3–15.4)
GFR SERPL CREATININE-BSD FRML MDRD: 68 ML/MIN/1.73
GLOBULIN UR ELPH-MCNC: 3.7 GM/DL
GLUCOSE BLD-MCNC: 126 MG/DL (ref 65–99)
GLUCOSE UR STRIP-MCNC: NEGATIVE MG/DL
HCT VFR BLD AUTO: 38.6 % (ref 34–46.6)
HGB BLD-MCNC: 13.2 G/DL (ref 12–15.9)
HGB UR QL STRIP.AUTO: ABNORMAL
HOLD SPECIMEN: NORMAL
HOLD SPECIMEN: NORMAL
HYALINE CASTS UR QL AUTO: ABNORMAL /LPF
IMM GRANULOCYTES # BLD AUTO: 0.06 10*3/MM3 (ref 0–0.05)
IMM GRANULOCYTES NFR BLD AUTO: 0.4 % (ref 0–0.5)
KETONES UR QL STRIP: NEGATIVE
LEUKOCYTE ESTERASE UR QL STRIP.AUTO: ABNORMAL
LIPASE SERPL-CCNC: 12 U/L (ref 13–60)
LYMPHOCYTES # BLD AUTO: 1.01 10*3/MM3 (ref 0.7–3.1)
LYMPHOCYTES NFR BLD AUTO: 6.7 % (ref 19.6–45.3)
MAGNESIUM SERPL-MCNC: 1.9 MG/DL (ref 1.6–2.6)
MCH RBC QN AUTO: 28.4 PG (ref 26.6–33)
MCHC RBC AUTO-ENTMCNC: 34.2 G/DL (ref 31.5–35.7)
MCV RBC AUTO: 83 FL (ref 79–97)
MONOCYTES # BLD AUTO: 0.81 10*3/MM3 (ref 0.1–0.9)
MONOCYTES NFR BLD AUTO: 5.3 % (ref 5–12)
NEUTROPHILS # BLD AUTO: 13.27 10*3/MM3 (ref 1.4–7)
NEUTROPHILS NFR BLD AUTO: 87.4 % (ref 42.7–76)
NITRITE UR QL STRIP: POSITIVE
PH UR STRIP.AUTO: 5.5 [PH] (ref 5–8)
PLATELET # BLD AUTO: 251 10*3/MM3 (ref 140–450)
PMV BLD AUTO: 9.9 FL (ref 6–12)
POTASSIUM BLD-SCNC: 2.8 MMOL/L (ref 3.5–5.2)
PROT SERPL-MCNC: 7.8 G/DL (ref 6–8.5)
PROT UR QL STRIP: ABNORMAL
RBC # BLD AUTO: 4.65 10*6/MM3 (ref 3.77–5.28)
RBC # UR: ABNORMAL /HPF
REF LAB TEST METHOD: ABNORMAL
SODIUM BLD-SCNC: 133 MMOL/L (ref 136–145)
SP GR UR STRIP: 1.01 (ref 1–1.03)
SQUAMOUS #/AREA URNS HPF: ABNORMAL /HPF
UROBILINOGEN UR QL STRIP: ABNORMAL
WBC NRBC COR # BLD: 15.18 10*3/MM3 (ref 3.4–10.8)
WBC UR QL AUTO: ABNORMAL /HPF
WHOLE BLOOD HOLD SPECIMEN: NORMAL
WHOLE BLOOD HOLD SPECIMEN: NORMAL

## 2019-03-30 PROCEDURE — 87186 SC STD MICRODIL/AGAR DIL: CPT | Performed by: NURSE PRACTITIONER

## 2019-03-30 PROCEDURE — 76705 ECHO EXAM OF ABDOMEN: CPT | Performed by: RADIOLOGY

## 2019-03-30 PROCEDURE — 87086 URINE CULTURE/COLONY COUNT: CPT | Performed by: NURSE PRACTITIONER

## 2019-03-30 PROCEDURE — 96365 THER/PROPH/DIAG IV INF INIT: CPT

## 2019-03-30 PROCEDURE — 74177 CT ABD & PELVIS W/CONTRAST: CPT | Performed by: RADIOLOGY

## 2019-03-30 PROCEDURE — 96375 TX/PRO/DX INJ NEW DRUG ADDON: CPT

## 2019-03-30 PROCEDURE — 99283 EMERGENCY DEPT VISIT LOW MDM: CPT

## 2019-03-30 PROCEDURE — 86140 C-REACTIVE PROTEIN: CPT | Performed by: PHYSICIAN ASSISTANT

## 2019-03-30 PROCEDURE — 25010000002 ONDANSETRON PER 1 MG: Performed by: PHYSICIAN ASSISTANT

## 2019-03-30 PROCEDURE — 85025 COMPLETE CBC W/AUTO DIFF WBC: CPT | Performed by: EMERGENCY MEDICINE

## 2019-03-30 PROCEDURE — 25010000002 MORPHINE PER 10 MG: Performed by: EMERGENCY MEDICINE

## 2019-03-30 PROCEDURE — 82150 ASSAY OF AMYLASE: CPT | Performed by: PHYSICIAN ASSISTANT

## 2019-03-30 PROCEDURE — 74177 CT ABD & PELVIS W/CONTRAST: CPT

## 2019-03-30 PROCEDURE — 83690 ASSAY OF LIPASE: CPT | Performed by: EMERGENCY MEDICINE

## 2019-03-30 PROCEDURE — 80053 COMPREHEN METABOLIC PANEL: CPT | Performed by: EMERGENCY MEDICINE

## 2019-03-30 PROCEDURE — 25010000002 IOPAMIDOL 61 % SOLUTION: Performed by: EMERGENCY MEDICINE

## 2019-03-30 PROCEDURE — 83735 ASSAY OF MAGNESIUM: CPT | Performed by: PHYSICIAN ASSISTANT

## 2019-03-30 PROCEDURE — 87040 BLOOD CULTURE FOR BACTERIA: CPT | Performed by: PHYSICIAN ASSISTANT

## 2019-03-30 PROCEDURE — 25010000002 PIPERACILLIN-TAZOBACTAM: Performed by: PHYSICIAN ASSISTANT

## 2019-03-30 PROCEDURE — 87088 URINE BACTERIA CULTURE: CPT | Performed by: NURSE PRACTITIONER

## 2019-03-30 PROCEDURE — 81001 URINALYSIS AUTO W/SCOPE: CPT | Performed by: EMERGENCY MEDICINE

## 2019-03-30 PROCEDURE — 76705 ECHO EXAM OF ABDOMEN: CPT

## 2019-03-30 RX ORDER — SODIUM CHLORIDE 0.9 % (FLUSH) 0.9 %
10 SYRINGE (ML) INJECTION AS NEEDED
Status: DISCONTINUED | OUTPATIENT
Start: 2019-03-30 | End: 2019-03-30 | Stop reason: HOSPADM

## 2019-03-30 RX ORDER — ONDANSETRON 2 MG/ML
4 INJECTION INTRAMUSCULAR; INTRAVENOUS ONCE
Status: COMPLETED | OUTPATIENT
Start: 2019-03-30 | End: 2019-03-30

## 2019-03-30 RX ORDER — POTASSIUM CHLORIDE 20 MEQ/1
40 TABLET, EXTENDED RELEASE ORAL ONCE
Status: COMPLETED | OUTPATIENT
Start: 2019-03-30 | End: 2019-03-30

## 2019-03-30 RX ORDER — CIPROFLOXACIN 500 MG/1
500 TABLET, FILM COATED ORAL 2 TIMES DAILY
Qty: 14 TABLET | Refills: 0 | Status: SHIPPED | OUTPATIENT
Start: 2019-03-30 | End: 2019-04-06

## 2019-03-30 RX ORDER — ONDANSETRON 4 MG/1
4 TABLET, ORALLY DISINTEGRATING ORAL EVERY 6 HOURS PRN
Qty: 10 TABLET | Refills: 0 | Status: SHIPPED | OUTPATIENT
Start: 2019-03-30 | End: 2022-02-28

## 2019-03-30 RX ADMIN — IOPAMIDOL 100 ML: 612 INJECTION, SOLUTION INTRAVENOUS at 14:37

## 2019-03-30 RX ADMIN — MORPHINE SULFATE 4 MG: 4 INJECTION INTRAVENOUS at 12:47

## 2019-03-30 RX ADMIN — SODIUM CHLORIDE 1000 ML: 9 INJECTION, SOLUTION INTRAVENOUS at 12:45

## 2019-03-30 RX ADMIN — ONDANSETRON 4 MG: 2 INJECTION, SOLUTION INTRAMUSCULAR; INTRAVENOUS at 12:46

## 2019-03-30 RX ADMIN — POTASSIUM CHLORIDE 40 MEQ: 1500 TABLET, EXTENDED RELEASE ORAL at 12:53

## 2019-03-30 RX ADMIN — PIPERACILLIN SODIUM,TAZOBACTAM SODIUM 3.38 G: 3; .375 INJECTION, POWDER, FOR SOLUTION INTRAVENOUS at 13:29

## 2019-04-03 LAB — BACTERIA SPEC AEROBE CULT: ABNORMAL

## 2019-04-04 LAB
BACTERIA SPEC AEROBE CULT: NORMAL
BACTERIA SPEC AEROBE CULT: NORMAL

## 2019-04-09 ENCOUNTER — OFFICE VISIT (OUTPATIENT)
Dept: SURGERY | Facility: CLINIC | Age: 59
End: 2019-04-09

## 2019-04-09 VITALS — BODY MASS INDEX: 24.8 KG/M2 | WEIGHT: 140 LBS | HEIGHT: 63 IN

## 2019-04-09 DIAGNOSIS — K80.20 GALLSTONES: Primary | ICD-10-CM

## 2019-04-09 PROCEDURE — 99214 OFFICE O/P EST MOD 30 MIN: CPT | Performed by: SURGERY

## 2019-04-09 RX ORDER — SODIUM, POTASSIUM,MAG SULFATES 17.5-3.13G
SOLUTION, RECONSTITUTED, ORAL ORAL
Qty: 2 BOTTLE | Refills: 0 | Status: SHIPPED | OUTPATIENT
Start: 2019-04-09 | End: 2022-02-28

## 2019-04-09 NOTE — H&P (VIEW-ONLY)
Subjective   Manuela Johnson is a 59 y.o. female.     History of Present Illness She has had RUQ pains for many years ago. She had several normal US done over that time, but a recent CT and US shows a gallbladder polyp and stone. She has never had a colonoscopy.    The following portions of the patient's history were reviewed and updated as appropriate: current medications, past family history, past medical history, past social history, past surgical history and problem list.    Review of Systems   Constitutional: Negative for activity change, appetite change, chills, fever and unexpected weight change.   HENT: Negative for congestion, facial swelling and sore throat.    Eyes: Negative for photophobia and visual disturbance.   Respiratory: Negative for chest tightness, shortness of breath and wheezing.    Cardiovascular: Negative for chest pain, palpitations and leg swelling.   Gastrointestinal: Positive for abdominal pain and nausea. Negative for abdominal distention, anal bleeding, blood in stool, constipation, diarrhea, rectal pain and vomiting.   Endocrine: Negative for cold intolerance, heat intolerance, polydipsia and polyuria.   Genitourinary: Negative for difficulty urinating, dysuria, flank pain and urgency.   Musculoskeletal: Negative for back pain and myalgias.   Skin: Negative for rash and wound.   Allergic/Immunologic: Negative for immunocompromised state.   Neurological: Negative for dizziness, seizures, syncope, light-headedness, numbness and headaches.   Hematological: Negative for adenopathy. Does not bruise/bleed easily.   Psychiatric/Behavioral: Negative for behavioral problems and confusion. The patient is not nervous/anxious.        Objective   Physical Exam   Constitutional: She is oriented to person, place, and time. She appears well-developed and well-nourished. She does not appear ill. No distress.   HENT:   Head: Normocephalic. Head is without laceration. Hair is normal.   Right Ear: Hearing  and ear canal normal.   Left Ear: Hearing and ear canal normal.   Nose: Nose normal. No sinus tenderness. No epistaxis. Right sinus exhibits no maxillary sinus tenderness and no frontal sinus tenderness. Left sinus exhibits no maxillary sinus tenderness and no frontal sinus tenderness.   Eyes: Conjunctivae and lids are normal. Pupils are equal, round, and reactive to light.   Neck: Normal range of motion. No JVD present. No tracheal tenderness present. No tracheal deviation present. No thyroid mass and no thyromegaly present.   Cardiovascular: Normal rate and regular rhythm. Exam reveals no gallop.   No murmur heard.  Pulmonary/Chest: Effort normal and breath sounds normal. No stridor. She has no wheezes. She exhibits no tenderness.   Abdominal: Soft. Bowel sounds are normal. She exhibits no distension, no ascites and no mass. There is tenderness. There is guarding. There is no rebound. No hernia.   Musculoskeletal: She exhibits no edema or deformity.   Lymphadenopathy:     She has no cervical adenopathy.     She has no axillary adenopathy.        Right: No inguinal and no supraclavicular adenopathy present.        Left: No inguinal and no supraclavicular adenopathy present.   Neurological: She is alert and oriented to person, place, and time. She exhibits normal muscle tone.   Skin: Skin is warm, dry and intact. No rash noted. No erythema. No pallor.   Psychiatric: She has a normal mood and affect. Her behavior is normal. Thought content normal.   Vitals reviewed.      Assessment/Plan   Manuela was seen today for cholelithiasis.    Diagnoses and all orders for this visit:    Gallstones    lap cody, and colonoscopy

## 2019-04-12 ENCOUNTER — APPOINTMENT (OUTPATIENT)
Dept: PREADMISSION TESTING | Facility: HOSPITAL | Age: 59
End: 2019-04-12

## 2019-04-15 ENCOUNTER — HOSPITAL ENCOUNTER (OUTPATIENT)
Facility: HOSPITAL | Age: 59
Setting detail: HOSPITAL OUTPATIENT SURGERY
Discharge: HOME | End: 2019-04-15
Attending: SURGERY | Admitting: SURGERY

## 2019-04-15 ENCOUNTER — ANESTHESIA EVENT (OUTPATIENT)
Dept: PERIOP | Facility: HOSPITAL | Age: 59
End: 2019-04-15

## 2019-04-15 ENCOUNTER — ANESTHESIA (OUTPATIENT)
Dept: PERIOP | Facility: HOSPITAL | Age: 59
End: 2019-04-15

## 2019-04-15 VITALS
WEIGHT: 140 LBS | HEIGHT: 62 IN | OXYGEN SATURATION: 96 % | DIASTOLIC BLOOD PRESSURE: 86 MMHG | SYSTOLIC BLOOD PRESSURE: 138 MMHG | RESPIRATION RATE: 18 BRPM | TEMPERATURE: 97.5 F | BODY MASS INDEX: 25.76 KG/M2 | HEART RATE: 74 BPM

## 2019-04-15 DIAGNOSIS — K80.20 GALLSTONES: ICD-10-CM

## 2019-04-15 PROCEDURE — 25010000002 FENTANYL CITRATE (PF) 100 MCG/2ML SOLUTION: Performed by: NURSE ANESTHETIST, CERTIFIED REGISTERED

## 2019-04-15 PROCEDURE — 25010000002 PROPOFOL 10 MG/ML EMULSION: Performed by: NURSE ANESTHETIST, CERTIFIED REGISTERED

## 2019-04-15 PROCEDURE — 25010000002 MIDAZOLAM PER 1 MG: Performed by: NURSE ANESTHETIST, CERTIFIED REGISTERED

## 2019-04-15 PROCEDURE — 25010000002 MORPHINE PER 10 MG: Performed by: ANESTHESIOLOGY

## 2019-04-15 PROCEDURE — 25010000002 ONDANSETRON PER 1 MG: Performed by: NURSE ANESTHETIST, CERTIFIED REGISTERED

## 2019-04-15 PROCEDURE — 45385 COLONOSCOPY W/LESION REMOVAL: CPT | Performed by: SURGERY

## 2019-04-15 PROCEDURE — 25010000002 NEOSTIGMINE 10 MG/10ML SOLUTION: Performed by: NURSE ANESTHETIST, CERTIFIED REGISTERED

## 2019-04-15 PROCEDURE — 47562 LAPAROSCOPIC CHOLECYSTECTOMY: CPT | Performed by: SURGERY

## 2019-04-15 RX ORDER — HYDROCODONE BITARTRATE AND ACETAMINOPHEN 5; 325 MG/1; MG/1
1 TABLET ORAL EVERY 4 HOURS PRN
Status: DISCONTINUED | OUTPATIENT
Start: 2019-04-15 | End: 2019-04-15 | Stop reason: HOSPADM

## 2019-04-15 RX ORDER — MORPHINE SULFATE 2 MG/ML
2 INJECTION, SOLUTION INTRAMUSCULAR; INTRAVENOUS
Status: DISCONTINUED | OUTPATIENT
Start: 2019-04-15 | End: 2019-04-15 | Stop reason: HOSPADM

## 2019-04-15 RX ORDER — SODIUM CHLORIDE 9 MG/ML
INJECTION, SOLUTION INTRAVENOUS AS NEEDED
Status: DISCONTINUED | OUTPATIENT
Start: 2019-04-15 | End: 2019-04-15 | Stop reason: HOSPADM

## 2019-04-15 RX ORDER — FENTANYL CITRATE 50 UG/ML
50 INJECTION, SOLUTION INTRAMUSCULAR; INTRAVENOUS
Status: COMPLETED | OUTPATIENT
Start: 2019-04-15 | End: 2019-04-15

## 2019-04-15 RX ORDER — ESMOLOL HYDROCHLORIDE 10 MG/ML
INJECTION INTRAVENOUS AS NEEDED
Status: DISCONTINUED | OUTPATIENT
Start: 2019-04-15 | End: 2019-04-15 | Stop reason: SURG

## 2019-04-15 RX ORDER — POTASSIUM CITRATE 5 MEQ/1
20 TABLET, EXTENDED RELEASE ORAL 2 TIMES DAILY WITH MEALS
COMMUNITY
End: 2022-02-28

## 2019-04-15 RX ORDER — NEOSTIGMINE METHYLSULFATE 1 MG/ML
INJECTION, SOLUTION INTRAVENOUS AS NEEDED
Status: DISCONTINUED | OUTPATIENT
Start: 2019-04-15 | End: 2019-04-15 | Stop reason: SURG

## 2019-04-15 RX ORDER — FAMOTIDINE 10 MG/ML
INJECTION, SOLUTION INTRAVENOUS AS NEEDED
Status: DISCONTINUED | OUTPATIENT
Start: 2019-04-15 | End: 2019-04-15 | Stop reason: SURG

## 2019-04-15 RX ORDER — OXYCODONE HYDROCHLORIDE AND ACETAMINOPHEN 5; 325 MG/1; MG/1
1 TABLET ORAL ONCE AS NEEDED
Status: COMPLETED | OUTPATIENT
Start: 2019-04-15 | End: 2019-04-15

## 2019-04-15 RX ORDER — SODIUM CHLORIDE 0.9 % (FLUSH) 0.9 %
3-10 SYRINGE (ML) INJECTION AS NEEDED
Status: DISCONTINUED | OUTPATIENT
Start: 2019-04-15 | End: 2019-04-15 | Stop reason: HOSPADM

## 2019-04-15 RX ORDER — ONDANSETRON 2 MG/ML
4 INJECTION INTRAMUSCULAR; INTRAVENOUS ONCE AS NEEDED
Status: DISCONTINUED | OUTPATIENT
Start: 2019-04-15 | End: 2019-04-15 | Stop reason: HOSPADM

## 2019-04-15 RX ORDER — HYDROCODONE BITARTRATE AND ACETAMINOPHEN 5; 325 MG/1; MG/1
1 TABLET ORAL EVERY 4 HOURS PRN
Qty: 20 TABLET | Refills: 0 | Status: SHIPPED | OUTPATIENT
Start: 2019-04-15 | End: 2019-04-25

## 2019-04-15 RX ORDER — FENTANYL CITRATE 50 UG/ML
INJECTION, SOLUTION INTRAMUSCULAR; INTRAVENOUS AS NEEDED
Status: DISCONTINUED | OUTPATIENT
Start: 2019-04-15 | End: 2019-04-15 | Stop reason: SURG

## 2019-04-15 RX ORDER — MAGNESIUM HYDROXIDE 1200 MG/15ML
LIQUID ORAL AS NEEDED
Status: DISCONTINUED | OUTPATIENT
Start: 2019-04-15 | End: 2019-04-15 | Stop reason: HOSPADM

## 2019-04-15 RX ORDER — ROCURONIUM BROMIDE 10 MG/ML
INJECTION, SOLUTION INTRAVENOUS AS NEEDED
Status: DISCONTINUED | OUTPATIENT
Start: 2019-04-15 | End: 2019-04-15 | Stop reason: SURG

## 2019-04-15 RX ORDER — GLYCOPYRROLATE 0.2 MG/ML
INJECTION INTRAMUSCULAR; INTRAVENOUS AS NEEDED
Status: DISCONTINUED | OUTPATIENT
Start: 2019-04-15 | End: 2019-04-15 | Stop reason: SURG

## 2019-04-15 RX ORDER — BUPIVACAINE HYDROCHLORIDE AND EPINEPHRINE 2.5; 5 MG/ML; UG/ML
INJECTION, SOLUTION EPIDURAL; INFILTRATION; INTRACAUDAL; PERINEURAL AS NEEDED
Status: DISCONTINUED | OUTPATIENT
Start: 2019-04-15 | End: 2019-04-15 | Stop reason: HOSPADM

## 2019-04-15 RX ORDER — SODIUM CHLORIDE, SODIUM LACTATE, POTASSIUM CHLORIDE, CALCIUM CHLORIDE 600; 310; 30; 20 MG/100ML; MG/100ML; MG/100ML; MG/100ML
125 INJECTION, SOLUTION INTRAVENOUS CONTINUOUS
Status: DISCONTINUED | OUTPATIENT
Start: 2019-04-15 | End: 2019-04-15 | Stop reason: HOSPADM

## 2019-04-15 RX ORDER — MEPERIDINE HYDROCHLORIDE 25 MG/ML
12.5 INJECTION INTRAMUSCULAR; INTRAVENOUS; SUBCUTANEOUS
Status: DISCONTINUED | OUTPATIENT
Start: 2019-04-15 | End: 2019-04-15 | Stop reason: HOSPADM

## 2019-04-15 RX ORDER — PROPOFOL 10 MG/ML
VIAL (ML) INTRAVENOUS AS NEEDED
Status: DISCONTINUED | OUTPATIENT
Start: 2019-04-15 | End: 2019-04-15 | Stop reason: SURG

## 2019-04-15 RX ORDER — SODIUM CHLORIDE 0.9 % (FLUSH) 0.9 %
3 SYRINGE (ML) INJECTION EVERY 12 HOURS SCHEDULED
Status: DISCONTINUED | OUTPATIENT
Start: 2019-04-15 | End: 2019-04-15 | Stop reason: HOSPADM

## 2019-04-15 RX ORDER — ONDANSETRON 2 MG/ML
INJECTION INTRAMUSCULAR; INTRAVENOUS AS NEEDED
Status: DISCONTINUED | OUTPATIENT
Start: 2019-04-15 | End: 2019-04-15 | Stop reason: SURG

## 2019-04-15 RX ORDER — MIDAZOLAM HYDROCHLORIDE 1 MG/ML
INJECTION INTRAMUSCULAR; INTRAVENOUS AS NEEDED
Status: DISCONTINUED | OUTPATIENT
Start: 2019-04-15 | End: 2019-04-15 | Stop reason: SURG

## 2019-04-15 RX ORDER — IPRATROPIUM BROMIDE AND ALBUTEROL SULFATE 2.5; .5 MG/3ML; MG/3ML
3 SOLUTION RESPIRATORY (INHALATION) ONCE AS NEEDED
Status: DISCONTINUED | OUTPATIENT
Start: 2019-04-15 | End: 2019-04-15 | Stop reason: HOSPADM

## 2019-04-15 RX ADMIN — GLYCOPYRROLATE 0.4 MG: 0.2 INJECTION INTRAMUSCULAR; INTRAVENOUS at 14:07

## 2019-04-15 RX ADMIN — FENTANYL CITRATE 50 MCG: 50 INJECTION INTRAMUSCULAR; INTRAVENOUS at 13:30

## 2019-04-15 RX ADMIN — ESMOLOL HYDROCHLORIDE 10 MG: 10 INJECTION, SOLUTION INTRAVENOUS at 13:38

## 2019-04-15 RX ADMIN — ROCURONIUM BROMIDE 20 MG: 10 INJECTION INTRAVENOUS at 13:21

## 2019-04-15 RX ADMIN — FENTANYL CITRATE 50 MCG: 50 INJECTION INTRAMUSCULAR; INTRAVENOUS at 14:45

## 2019-04-15 RX ADMIN — FENTANYL CITRATE 50 MCG: 50 INJECTION INTRAMUSCULAR; INTRAVENOUS at 14:38

## 2019-04-15 RX ADMIN — FENTANYL CITRATE 100 MCG: 50 INJECTION INTRAMUSCULAR; INTRAVENOUS at 13:15

## 2019-04-15 RX ADMIN — ONDANSETRON 4 MG: 2 INJECTION, SOLUTION INTRAMUSCULAR; INTRAVENOUS at 13:20

## 2019-04-15 RX ADMIN — MORPHINE SULFATE 2 MG: 2 INJECTION, SOLUTION INTRAMUSCULAR; INTRAVENOUS at 15:29

## 2019-04-15 RX ADMIN — OXYCODONE HYDROCHLORIDE AND ACETAMINOPHEN 1 TABLET: 5; 325 TABLET ORAL at 15:02

## 2019-04-15 RX ADMIN — SODIUM CHLORIDE, POTASSIUM CHLORIDE, SODIUM LACTATE AND CALCIUM CHLORIDE 125 ML/HR: 600; 310; 30; 20 INJECTION, SOLUTION INTRAVENOUS at 12:24

## 2019-04-15 RX ADMIN — NEOSTIGMINE METHYLSULFATE 2 MG: 1 INJECTION, SOLUTION INTRAVENOUS at 14:07

## 2019-04-15 RX ADMIN — FENTANYL CITRATE 50 MCG: 50 INJECTION INTRAMUSCULAR; INTRAVENOUS at 14:00

## 2019-04-15 RX ADMIN — MIDAZOLAM HYDROCHLORIDE 2 MG: 1 INJECTION, SOLUTION INTRAMUSCULAR; INTRAVENOUS at 13:15

## 2019-04-15 RX ADMIN — ESMOLOL HYDROCHLORIDE 10 MG: 10 INJECTION, SOLUTION INTRAVENOUS at 13:35

## 2019-04-15 RX ADMIN — FAMOTIDINE 20 MG: 10 INJECTION, SOLUTION INTRAVENOUS at 13:20

## 2019-04-15 RX ADMIN — PROPOFOL 120 MG: 10 INJECTION, EMULSION INTRAVENOUS at 13:21

## 2019-04-15 RX ADMIN — SODIUM CHLORIDE, POTASSIUM CHLORIDE, SODIUM LACTATE AND CALCIUM CHLORIDE: 600; 310; 30; 20 INJECTION, SOLUTION INTRAVENOUS at 13:15

## 2019-04-15 NOTE — ANESTHESIA PROCEDURE NOTES
Airway  Urgency: elective    Date/Time: 4/15/2019 1:25 PM  Airway not difficult    General Information and Staff    Patient location during procedure: OR  Anesthesiologist: Eligio Salas MD  CRNA: Irma Elder CRNA    Indications and Patient Condition  Indications for airway management: airway protection    Preoxygenated: yes  MILS maintained throughout  Mask difficulty assessment: 1 - vent by mask    Final Airway Details  Final airway type: endotracheal airway      Successful airway: ETT  Cuffed: yes   Successful intubation technique: direct laryngoscopy  Endotracheal tube insertion site: oral  Blade: Arboleda  Blade size: 2  ETT size (mm): 7.5  Cormack-Lehane Classification: grade I - full view of glottis  Placement verified by: chest auscultation   Measured from: lips  ETT to lips (cm): 21  Number of attempts at approach: 1    Additional Comments  No trauma or change to dentition

## 2019-04-15 NOTE — OP NOTE
CHOLECYSTECTOMY LAPAROSCOPIC, COLONOSCOPY  Procedure Note    Manuela Johnson  4/15/2019    Pre-op Diagnosis:   Gallstones [K80.20]    Post-op Diagnosis: same, polyp in sigmoid        Procedure(s):  CHOLECYSTECTOMY LAPAROSCOPIC with liver biopsy  COLONOSCOPY    Surgeon(s):  Srinivas Perez MD    Anesthesia: General    Staff:   Circulator: Karen Bright RN; Lissette Kumar RN  Scrub Person: Ann Wall  Endo Technician: Isaac Longoria  Assistant: Srinivas Chris    Estimated Blood Loss: minimal    Specimens:                  Order Name Source Comment Collection Info Order Time   SURGICAL PATHOLOGY EXAM Gallbladder  Collected By: Srinivas Perez MD 4/15/2019  1:36 PM    Collection Date  4/15/2019       Collection Time   1:36 PM      TISSUE PATHOLOGY EXAM Liver  Collected By: Srinivas Perez MD 4/15/2019  1:37 PM         Drains:      Procedure: The abdomen was prepped and draped. Two 5 mm and one 11 mm port placed. The cystic duct was dissected out, clipped and divided. The gallbladder was taken off the liver with cautery and removed from the upper midline port   The liver looked a little scarred so a small piece was taken off the anterior surface with a scoop. The are was cauterized and once dry the gas was allowed to escape. The ports were closed with vicryl. Local was applied and the dressings applied.   The patient was placed on her left side and the colonoscope advanced slowly to the cecum. She had a very long large colon. The ileocecal valved and terminal ileum were normal. The colon was unremarkable down to the sigmoid about 45 cm where a small polyp was removed with the snare and cautery.     Findings: polyp, gallstones.     Complications: none   Grafts / Implants N/A    Srinivas Perez MD     Date: 4/15/2019  Time: 2:08 PM

## 2019-04-15 NOTE — ANESTHESIA POSTPROCEDURE EVALUATION
Patient: Manuela Johnson    Procedure Summary     Date:  04/15/19 Room / Location:   [facility] 02 /  [facility]    Anesthesia Start:  1317 Anesthesia Stop:  1422    Procedures:       CHOLECYSTECTOMY LAPAROSCOPIC with liver biopsy (N/A Abdomen)      COLONOSCOPY (N/A ) Diagnosis:       Gallstones      (Gallstones [K80.20])    Surgeon:  Srinivas Perez MD Provider:  Eligio Salas MD    Anesthesia Type:  general ASA Status:  2          Anesthesia Type: general  Last vitals  BP   138/86 (04/15/19 1611)   Temp   97.5 °F (36.4 °C) (04/15/19 1455)   Pulse   74 (04/15/19 1611)   Resp   18 (04/15/19 1611)     SpO2   96 % (04/15/19 1611)     Post Anesthesia Care and Evaluation    Patient location during evaluation: PHASE II  Patient participation: complete - patient participated  Level of consciousness: awake and alert  Pain score: 1  Pain management: adequate  Airway patency: patent  Anesthetic complications: No anesthetic complications  PONV Status: controlled  Cardiovascular status: acceptable  Respiratory status: acceptable  Hydration status: acceptable

## 2019-04-15 NOTE — ANESTHESIA PREPROCEDURE EVALUATION
Anesthesia Evaluation     Patient summary reviewed and Nursing notes reviewed   no history of anesthetic complications:  NPO Solid Status: > 8 hours  NPO Liquid Status: > 8 hours           Airway   Mallampati: I  TM distance: >3 FB  Neck ROM: full  No difficulty expected  Dental - normal exam     Pulmonary - normal exam   (+) a smoker Former, COPD, sleep apnea,   Cardiovascular - normal exam    (+) hypertension,       Neuro/Psych- negative ROS    ROS Comment: Restless legs  GI/Hepatic/Renal/Endo    (+)   hypothyroidism,     Musculoskeletal     Abdominal  - normal exam    Bowel sounds: normal.   Substance History - negative use     OB/GYN negative ob/gyn ROS         Other   (+) arthritis                     Anesthesia Plan    ASA 2     general     intravenous induction   Anesthetic plan, all risks, benefits, and alternatives have been provided, discussed and informed consent has been obtained with: patient.    Plan discussed with CRNA.

## 2019-04-18 LAB
LAB AP CASE REPORT: NORMAL
LAB AP CASE REPORT: NORMAL
PATH REPORT.FINAL DX SPEC: NORMAL
PATH REPORT.FINAL DX SPEC: NORMAL

## 2019-04-29 ENCOUNTER — OFFICE VISIT (OUTPATIENT)
Dept: SURGERY | Facility: CLINIC | Age: 59
End: 2019-04-29

## 2019-04-29 VITALS — BODY MASS INDEX: 25.76 KG/M2 | HEIGHT: 62 IN | WEIGHT: 140 LBS

## 2019-04-29 DIAGNOSIS — Z90.49 STATUS POST LAPAROSCOPIC CHOLECYSTECTOMY: Primary | ICD-10-CM

## 2019-04-29 PROCEDURE — 99024 POSTOP FOLLOW-UP VISIT: CPT | Performed by: SURGERY

## 2019-04-29 NOTE — PROGRESS NOTES
Subjective   Manuela Johnson is a 59 y.o. female.     History of Present Illness She is doing very well post lap cody. She did have some fibrosis and scarring in her liver. She does not drink alcohol.     The following portions of the patient's history were reviewed and updated as appropriate: current medications, past family history, past medical history, past social history, past surgical history and problem list.    Review of Systems    Objective   Physical Exam wounds ok    Assessment/Plan   Manuela was seen today for post-op follow-up.    Diagnoses and all orders for this visit:    Status post laparoscopic cholecystectomy    return prn

## 2019-11-12 ENCOUNTER — TRANSCRIBE ORDERS (OUTPATIENT)
Dept: ADMINISTRATIVE | Facility: HOSPITAL | Age: 59
End: 2019-11-12

## 2019-11-12 DIAGNOSIS — K76.0 FATTY LIVER: Primary | ICD-10-CM

## 2019-11-20 ENCOUNTER — HOSPITAL ENCOUNTER (OUTPATIENT)
Dept: ULTRASOUND IMAGING | Facility: HOSPITAL | Age: 59
Discharge: HOME | End: 2019-11-20
Admitting: INTERNAL MEDICINE

## 2019-11-20 DIAGNOSIS — K76.0 FATTY LIVER: ICD-10-CM

## 2019-11-20 PROCEDURE — 76705 ECHO EXAM OF ABDOMEN: CPT | Performed by: RADIOLOGY

## 2019-11-20 PROCEDURE — 76705 ECHO EXAM OF ABDOMEN: CPT

## 2020-01-15 ENCOUNTER — HOSPITAL ENCOUNTER (OUTPATIENT)
Dept: MAMMOGRAPHY | Facility: HOSPITAL | Age: 60
Discharge: HOME | End: 2020-01-15
Admitting: RADIOLOGY

## 2020-01-15 ENCOUNTER — HOSPITAL ENCOUNTER (OUTPATIENT)
Dept: BONE DENSITY | Facility: HOSPITAL | Age: 60
Discharge: HOME | End: 2020-01-15

## 2020-01-15 DIAGNOSIS — R92.8 ABNORMAL MAMMOGRAM: ICD-10-CM

## 2020-01-15 DIAGNOSIS — M81.0 OSTEOPOROSIS, POST-MENOPAUSAL: ICD-10-CM

## 2020-01-15 PROCEDURE — 77066 DX MAMMO INCL CAD BI: CPT | Performed by: RADIOLOGY

## 2020-01-15 PROCEDURE — 77062 BREAST TOMOSYNTHESIS BI: CPT | Performed by: RADIOLOGY

## 2020-01-15 PROCEDURE — 77066 DX MAMMO INCL CAD BI: CPT

## 2020-01-15 PROCEDURE — 77080 DXA BONE DENSITY AXIAL: CPT

## 2020-01-15 PROCEDURE — 77080 DXA BONE DENSITY AXIAL: CPT | Performed by: RADIOLOGY

## 2020-01-15 PROCEDURE — G0279 TOMOSYNTHESIS, MAMMO: HCPCS

## 2020-06-16 ENCOUNTER — HOSPITAL ENCOUNTER (EMERGENCY)
Facility: HOSPITAL | Age: 60
Discharge: HOME | End: 2020-06-16
Attending: EMERGENCY MEDICINE | Admitting: EMERGENCY MEDICINE

## 2020-06-16 ENCOUNTER — APPOINTMENT (OUTPATIENT)
Dept: GENERAL RADIOLOGY | Facility: HOSPITAL | Age: 60
End: 2020-06-16

## 2020-06-16 VITALS
BODY MASS INDEX: 24.84 KG/M2 | HEIGHT: 62 IN | WEIGHT: 135 LBS | TEMPERATURE: 97.4 F | DIASTOLIC BLOOD PRESSURE: 95 MMHG | OXYGEN SATURATION: 95 % | RESPIRATION RATE: 16 BRPM | SYSTOLIC BLOOD PRESSURE: 152 MMHG | HEART RATE: 83 BPM

## 2020-06-16 DIAGNOSIS — S93.401A SPRAIN OF RIGHT ANKLE, UNSPECIFIED LIGAMENT, INITIAL ENCOUNTER: Primary | ICD-10-CM

## 2020-06-16 PROCEDURE — 99283 EMERGENCY DEPT VISIT LOW MDM: CPT

## 2020-06-16 PROCEDURE — 73610 X-RAY EXAM OF ANKLE: CPT

## 2020-06-16 PROCEDURE — 73630 X-RAY EXAM OF FOOT: CPT

## 2020-06-16 NOTE — ED PROVIDER NOTES
Subjective   60-year-old female presents to the emergency room with right foot and ankle pain status post fall approximately 2 hours ago.  Patient states she was pressure washing her deck when she stumbled and turned her ankle.  Aggravating factors include movement.  She is unable to bear weight on it.  Denies any alleviating factors.  Denies any previous injury.      History provided by:  Patient   used: No        Review of Systems   Constitutional: Negative.  Negative for fever.   HENT: Negative.    Respiratory: Negative.    Cardiovascular: Negative.  Negative for chest pain.   Gastrointestinal: Negative.  Negative for abdominal pain.   Endocrine: Negative.    Genitourinary: Negative.  Negative for dysuria.   Musculoskeletal:        (+) right foot/ankle pain   Skin: Negative.    Neurological: Negative.    Psychiatric/Behavioral: Negative.    All other systems reviewed and are negative.      Past Medical History:   Diagnosis Date   • Arthritis    • Cancer (CMS/HCC)     melanoma   • COPD (chronic obstructive pulmonary disease) (CMS/HCC)    • History of transfusion    • Hypertension    • Hypothyroid    • MRSA (methicillin resistant Staphylococcus aureus) carrier     2017   • Sleep apnea        No Known Allergies    Past Surgical History:   Procedure Laterality Date   • ANTERIOR VAGINAL REPAIR     • BREAST CYST EXCISION Right 1992    benign   • CHOLECYSTECTOMY N/A 4/15/2019    Procedure: CHOLECYSTECTOMY LAPAROSCOPIC with liver biopsy;  Surgeon: Srinivas Perez MD;  Location: [facility] OR;  Service: General   • COLONOSCOPY N/A 4/15/2019    Procedure: COLONOSCOPY;  Surgeon: Srinivas Perez MD;  Location: [facility] OR;  Service: General   • ESSURE TUBAL LIGATION     • INCISION AND DRAINAGE ABSCESS     • INCISION AND DRAINAGE ARM Left 8/15/2017    Procedure: INCISION AND DRAINAGE ABSCESS;  Surgeon: Ramakrishna Waldrop MD;  Location: [facility] OR;  Service:        Family History   Problem Relation Age of Onset   •  Hypertension Father    • Cancer Father    • Hypertension Brother    • Cancer Brother    • Breast cancer Neg Hx        Social History     Socioeconomic History   • Marital status:      Spouse name: Not on file   • Number of children: Not on file   • Years of education: Not on file   • Highest education level: Not on file   Tobacco Use   • Smoking status: Former Smoker     Packs/day: 1.00     Years: 47.00     Pack years: 47.00     Last attempt to quit: 2018     Years since quittin.5   • Smokeless tobacco: Never Used   Substance and Sexual Activity   • Alcohol use: No   • Drug use: No   • Sexual activity: Defer           Objective   Physical Exam   Constitutional: She is oriented to person, place, and time. She appears well-developed and well-nourished. No distress.   HENT:   Head: Normocephalic and atraumatic.   Right Ear: External ear normal.   Left Ear: External ear normal.   Nose: Nose normal.   Eyes: Pupils are equal, round, and reactive to light. Conjunctivae and EOM are normal.   Neck: Normal range of motion. Neck supple. No JVD present. No tracheal deviation present.   Cardiovascular: Normal rate, regular rhythm and normal heart sounds.   No murmur heard.  Pulmonary/Chest: Effort normal and breath sounds normal. No respiratory distress. She has no wheezes.   Abdominal: Soft. Bowel sounds are normal. There is no tenderness.   Musculoskeletal: She exhibits no edema or deformity.        Right foot: There is decreased range of motion, tenderness and bony tenderness.        Neurological: She is alert and oriented to person, place, and time. No cranial nerve deficit.   Skin: Skin is warm and dry. No rash noted. She is not diaphoretic. No erythema. No pallor.   Psychiatric: She has a normal mood and affect. Her behavior is normal. Thought content normal.   Nursing note and vitals reviewed.      Procedures           ED Course  ED Course as of e  IMPRESSION:   Negative  right ankle.    Signer Name: Timo Blackman MD  Signed: 6/16/2020 7:52 PM  [facility]   [facility]    XR Ankle 3+ View Right [TK]   2012 IMPRESSION:   Negative right foot.    Signer Name: Timo Blackman MD  Signed: 6/16/2020 7:53 PM  Workstation Name: [email or web address]   [facility]    XR Foot 3+ View Right [TK]      ED Course User Index  [TK] Casa Lala PA-C                                           [facility]  Number of Diagnoses or Management Options  Sprain of right ankle, unspecified ligament, initial encounter: new and requires workup     Amount and/or Complexity of Data Reviewed  Tests in the radiology section of CPT®: reviewed and ordered    Risk of Complications, Morbidity, and/or Mortality  Presenting problems: moderate  Diagnostic procedures: moderate  Management options: minimal    Patient Progress  Patient progress: stable      Final diagnoses:   Sprain of right ankle, unspecified ligament, initial encounter            Casa Lala PA-C  06/16/20 2030

## 2021-02-22 DIAGNOSIS — Z23 IMMUNIZATION DUE: ICD-10-CM

## 2022-01-13 ENCOUNTER — HOSPITAL ENCOUNTER (OUTPATIENT)
Dept: GENERAL RADIOLOGY | Facility: HOSPITAL | Age: 62
Discharge: HOME | End: 2022-01-13
Admitting: NURSE PRACTITIONER

## 2022-01-13 ENCOUNTER — TRANSCRIBE ORDERS (OUTPATIENT)
Dept: ADMINISTRATIVE | Facility: HOSPITAL | Age: 62
End: 2022-01-13

## 2022-01-13 DIAGNOSIS — R05.9 COUGH: Primary | ICD-10-CM

## 2022-01-13 PROCEDURE — 71046 X-RAY EXAM CHEST 2 VIEWS: CPT | Performed by: RADIOLOGY

## 2022-01-13 PROCEDURE — 71046 X-RAY EXAM CHEST 2 VIEWS: CPT

## 2022-02-28 ENCOUNTER — OFFICE VISIT (OUTPATIENT)
Dept: PULMONOLOGY | Facility: CLINIC | Age: 62
End: 2022-02-28

## 2022-02-28 VITALS
SYSTOLIC BLOOD PRESSURE: 142 MMHG | BODY MASS INDEX: 26.68 KG/M2 | HEIGHT: 62 IN | HEART RATE: 102 BPM | DIASTOLIC BLOOD PRESSURE: 72 MMHG | WEIGHT: 145 LBS | TEMPERATURE: 96.9 F | OXYGEN SATURATION: 97 %

## 2022-02-28 DIAGNOSIS — J44.9 CHRONIC OBSTRUCTIVE PULMONARY DISEASE, UNSPECIFIED COPD TYPE: Primary | ICD-10-CM

## 2022-02-28 DIAGNOSIS — Z12.2 ENCOUNTER FOR SCREENING FOR LUNG CANCER: ICD-10-CM

## 2022-02-28 DIAGNOSIS — F17.211 CIGARETTE NICOTINE DEPENDENCE IN REMISSION: ICD-10-CM

## 2022-02-28 PROCEDURE — 99204 OFFICE O/P NEW MOD 45 MIN: CPT | Performed by: NURSE PRACTITIONER

## 2022-02-28 RX ORDER — OXYBUTYNIN CHLORIDE 5 MG/1
5 TABLET, EXTENDED RELEASE ORAL DAILY
COMMUNITY
Start: 2022-01-25

## 2022-02-28 RX ORDER — IPRATROPIUM BROMIDE AND ALBUTEROL SULFATE 2.5; .5 MG/3ML; MG/3ML
SOLUTION RESPIRATORY (INHALATION) 4 TIMES DAILY
COMMUNITY
Start: 2018-06-20

## 2022-02-28 RX ORDER — MIRTAZAPINE 15 MG/1
15-30 TABLET, FILM COATED ORAL NIGHTLY PRN
COMMUNITY
Start: 2022-01-25

## 2022-02-28 RX ORDER — METOPROLOL SUCCINATE 25 MG/1
25 TABLET, EXTENDED RELEASE ORAL DAILY
COMMUNITY
Start: 2021-12-14

## 2022-02-28 RX ORDER — PANTOPRAZOLE SODIUM 40 MG/1
40 TABLET, DELAYED RELEASE ORAL DAILY
COMMUNITY
Start: 2021-12-14

## 2022-02-28 RX ORDER — ERGOCALCIFEROL 1.25 MG/1
50000 CAPSULE ORAL WEEKLY
COMMUNITY
Start: 2021-12-21

## 2022-03-01 DIAGNOSIS — Z01.818 OTHER SPECIFIED PRE-OPERATIVE EXAMINATION: Primary | ICD-10-CM

## 2022-03-04 ENCOUNTER — PATIENT ROUNDING (BHMG ONLY) (OUTPATIENT)
Dept: PULMONOLOGY | Facility: CLINIC | Age: 62
End: 2022-03-04

## 2022-03-17 ENCOUNTER — HOSPITAL ENCOUNTER (OUTPATIENT)
Dept: RESPIRATORY THERAPY | Facility: HOSPITAL | Age: 62
Discharge: HOME | End: 2022-03-17
Admitting: NURSE PRACTITIONER

## 2022-03-17 DIAGNOSIS — J44.9 CHRONIC OBSTRUCTIVE PULMONARY DISEASE, UNSPECIFIED COPD TYPE: ICD-10-CM

## 2022-03-17 PROCEDURE — 94729 DIFFUSING CAPACITY: CPT | Performed by: INTERNAL MEDICINE

## 2022-03-17 PROCEDURE — 94060 EVALUATION OF WHEEZING: CPT | Performed by: INTERNAL MEDICINE

## 2022-03-17 PROCEDURE — 94726 PLETHYSMOGRAPHY LUNG VOLUMES: CPT

## 2022-03-17 PROCEDURE — 94060 EVALUATION OF WHEEZING: CPT

## 2022-03-17 PROCEDURE — 94729 DIFFUSING CAPACITY: CPT

## 2022-03-17 PROCEDURE — 94640 AIRWAY INHALATION TREATMENT: CPT

## 2022-03-17 PROCEDURE — 94726 PLETHYSMOGRAPHY LUNG VOLUMES: CPT | Performed by: INTERNAL MEDICINE

## 2022-03-17 RX ORDER — ALBUTEROL SULFATE 2.5 MG/3ML
2.5 SOLUTION RESPIRATORY (INHALATION) ONCE
Status: COMPLETED | OUTPATIENT
Start: 2022-03-17 | End: 2022-03-17

## 2022-03-17 RX ADMIN — ALBUTEROL SULFATE 2.5 MG: 2.5 SOLUTION RESPIRATORY (INHALATION) at 11:06

## 2022-03-25 ENCOUNTER — HOSPITAL ENCOUNTER (OUTPATIENT)
Dept: CT IMAGING | Facility: HOSPITAL | Age: 62
Discharge: HOME | End: 2022-03-25
Admitting: NURSE PRACTITIONER

## 2022-03-25 DIAGNOSIS — F17.211 CIGARETTE NICOTINE DEPENDENCE IN REMISSION: ICD-10-CM

## 2022-03-25 DIAGNOSIS — Z12.2 ENCOUNTER FOR SCREENING FOR LUNG CANCER: ICD-10-CM

## 2022-03-25 PROCEDURE — 71271 CT THORAX LUNG CANCER SCR C-: CPT | Performed by: RADIOLOGY

## 2022-03-25 PROCEDURE — 71271 CT THORAX LUNG CANCER SCR C-: CPT

## 2022-03-31 ENCOUNTER — DOCUMENTATION (OUTPATIENT)
Dept: PULMONOLOGY | Facility: CLINIC | Age: 62
End: 2022-03-31

## 2022-03-31 NOTE — PROGRESS NOTES
Notified the patient of CT Chest results.  6 mm nodule is noted.  We will repeat CT chest in one year per the screening protocol.  PFt was also discussed.  She is currently on symbicort and awaiting approval for [facility].

## 2022-06-01 ENCOUNTER — OFFICE VISIT (OUTPATIENT)
Dept: PULMONOLOGY | Facility: CLINIC | Age: 62
End: 2022-06-01

## 2022-06-01 VITALS
SYSTOLIC BLOOD PRESSURE: 132 MMHG | OXYGEN SATURATION: 94 % | BODY MASS INDEX: 25.03 KG/M2 | HEIGHT: 62 IN | WEIGHT: 136 LBS | DIASTOLIC BLOOD PRESSURE: 62 MMHG | HEART RATE: 99 BPM | TEMPERATURE: 97.1 F

## 2022-06-01 DIAGNOSIS — F17.211 CIGARETTE NICOTINE DEPENDENCE IN REMISSION: ICD-10-CM

## 2022-06-01 DIAGNOSIS — J44.9 CHRONIC OBSTRUCTIVE PULMONARY DISEASE, UNSPECIFIED COPD TYPE: Primary | ICD-10-CM

## 2022-06-01 PROCEDURE — 99214 OFFICE O/P EST MOD 30 MIN: CPT | Performed by: NURSE PRACTITIONER

## 2022-06-01 RX ORDER — TEMAZEPAM 15 MG/1
15 CAPSULE ORAL NIGHTLY PRN
COMMUNITY
Start: 2022-05-19

## 2022-06-01 NOTE — PROGRESS NOTES
"Chief Complaint  COPD    Subjective        Manuela Johnson presents to [facility] PULMONARY & CRITICAL CARE MEDICINE  History of Present Illness     Ms. Johnson is a 62-year-old female with a medical history significant for COPD, hypertension, sleep apnea and hypothyroidism.    She presents today for routine follow-up on COPD.  She states that she is doing well.  She reports that her shortness of breath is at baseline.  She is currently on Trelegy once daily and using albuterol every 4 hours as needed.    Objective   Vital Signs:  /62   Pulse 99   Temp 97.1 °F (36.2 °C) (Temporal)   Ht 157.5 cm (62\")   Wt 61.7 kg (136 lb)   SpO2 94%   BMI 24.87 kg/m²     BMI is within normal parameters. No other follow-up for BMI required.      Physical Exam     GENERAL APPEARANCE: Well developed, well nourished, alert and cooperative, and appears to be in no acute distress.    HEAD: normocephalic. Atraumatic.    EYES: PERRL, EOMI. Vision is grossly intact.    THROAT: Oral cavity and pharynx normal. No inflammation, swelling, exudate, or lesions.     NECK: Neck supple.  No thyromegaly.    CARDIAC: Normal S1 and S2. No S3, S4 or murmurs. Rhythm is regular.     RESPIRATORY:Bilateral air entry positive. Bilateral diminished breath sounds. No wheezing, crackles or rhonchi noted.    GI: Positive bowel sounds. Soft, nondistended, nontender.     MUSCULOSKELETAL: No significant deformity or joint abnormality. No edema. Peripheral pulses intact. No varicosities.    NEUROLOGICAL: Strength and sensation symmetric and intact throughout.     PSYCHIATRIC: The mental examination revealed the patient was oriented to person, place, and time.     Result Review :  The following data was reviewed by: SAUL Amato on 06/01/2022:      Data reviewed: CT chest          Assessment and Plan   Diagnoses and all orders for this visit:    1. Chronic obstructive pulmonary disease, unspecified COPD type (HCC) " (Primary)    2. Cigarette nicotine dependence in remission    Other orders  -     Fluticasone-Umeclidin-Vilant (Trelegy Ellipta) 200-62.5-25 MCG/INH inhaler; Inhale 1 puff Daily.  Dispense: 60 each; Refill: 5            COPD, unspecified type:  -Continue Trelegy once daily.  She tells me that her insurance would not cover this inhaler, we provided her with samples and will obtain PA.  -Continue albuterol every 4 hours as needed.        Former smoker:  LDCT for lung cancer screening was completed in March 2022.  RADS category 1.  We will repeat LDCT in March 2023.    Follow Up   Return in about 6 months (around 12/1/2022).  Patient was given instructions and counseling regarding her condition or for health maintenance advice. Please see specific information pulled into the AVS if appropriate.

## 2022-10-10 DIAGNOSIS — J44.9 CHRONIC OBSTRUCTIVE PULMONARY DISEASE, UNSPECIFIED COPD TYPE: Primary | ICD-10-CM

## 2022-12-15 ENCOUNTER — OFFICE VISIT (OUTPATIENT)
Dept: PULMONOLOGY | Facility: CLINIC | Age: 62
End: 2022-12-15

## 2022-12-15 VITALS
HEART RATE: 80 BPM | TEMPERATURE: 96.8 F | BODY MASS INDEX: 25.76 KG/M2 | SYSTOLIC BLOOD PRESSURE: 117 MMHG | HEIGHT: 62 IN | WEIGHT: 140 LBS | OXYGEN SATURATION: 97 % | DIASTOLIC BLOOD PRESSURE: 82 MMHG

## 2022-12-15 DIAGNOSIS — E66.3 OVERWEIGHT: ICD-10-CM

## 2022-12-15 DIAGNOSIS — J44.9 CHRONIC OBSTRUCTIVE PULMONARY DISEASE, UNSPECIFIED COPD TYPE: Primary | ICD-10-CM

## 2022-12-15 DIAGNOSIS — F17.211 CIGARETTE NICOTINE DEPENDENCE IN REMISSION: ICD-10-CM

## 2022-12-15 DIAGNOSIS — Z12.2 ENCOUNTER FOR SCREENING FOR LUNG CANCER: ICD-10-CM

## 2022-12-15 PROCEDURE — 99214 OFFICE O/P EST MOD 30 MIN: CPT | Performed by: NURSE PRACTITIONER

## 2022-12-15 RX ORDER — ALBUTEROL SULFATE 90 UG/1
2 AEROSOL, METERED RESPIRATORY (INHALATION) EVERY 4 HOURS PRN
Qty: 6.7 G | Refills: 5 | Status: SHIPPED | OUTPATIENT
Start: 2022-12-15

## 2022-12-15 NOTE — PROGRESS NOTES
"Chief Complaint  COPD    Subjective        Manuela Johnson presents to [facility] PULMONARY & CRITICAL CARE MEDICINE  History of Present Illness     Ms. Johnson is a 62 year old female with a medical history significant for arthritis, COPD, hypertension, hypothyroidism and sleep apnea.    She presents today for follow up on COPD.  She states that she has been doing well since her last visit.  She tells me that her shortness of breath is at baseline.  She is taking Trelegy once daily and albuterol as needed.     Objective   Vital Signs:  /82   Pulse 80   Temp 96.8 °F (36 °C) (Temporal)   Ht 157.5 cm (62\")   Wt 63.5 kg (140 lb)   SpO2 97%   BMI 25.61 kg/m²   Estimated body mass index is 25.61 kg/m² as calculated from the following:    Height as of this encounter: 157.5 cm (62\").    Weight as of this encounter: 63.5 kg (140 lb).    BMI is >= 25 and <30. (Overweight) The following options were offered after discussion;: exercise counseling/recommendations and nutrition counseling/recommendations      Physical Exam     GENERAL APPEARANCE: Well developed, well nourished, alert and cooperative, and appears to be in no acute distress.    HEAD: normocephalic. Atraumatic.    EYES: PERRL, EOMI. Vision is grossly intact.    THROAT: Oral cavity and pharynx normal. No inflammation, swelling, exudate, or lesions.     NECK: Neck supple.  No thyromegaly.    CARDIAC: Normal S1 and S2. No S3, S4 or murmurs. Rhythm is regular.     RESPIRATORY:Bilateral air entry positive. Bilateral diminished breath sounds. No wheezing, crackles or rhonchi noted.    GI: Positive bowel sounds. Soft, nondistended, nontender.     MUSCULOSKELETAL: No significant deformity or joint abnormality. No edema. Peripheral pulses intact. No varicosities.    NEUROLOGICAL: Strength and sensation symmetric and intact throughout.     PSYCHIATRIC: The mental examination revealed the patient was oriented to person, place, and time.     Result " Review :  The following data was reviewed by: SAUL Amato on 12/15/2022:               Assessment and Plan   Diagnoses and all orders for this visit:    1. Chronic obstructive pulmonary disease, unspecified COPD type (HCC) (Primary)    2. Cigarette nicotine dependence in remission  -     CT chest low dose wo; Future    3. Encounter for screening for lung cancer  -     CT chest low dose wo; Future    4. Overweight    Other orders  -     albuterol sulfate  (90 Base) MCG/ACT inhaler; Inhale 2 puffs Every 4 (Four) Hours As Needed for Wheezing.  Dispense: 6.7 g; Refill: 5        Continue Trelegy once daily.  Continue albuterol as needed. Sent refills to pharmacy.    She has not been able to tolerate cpap in the past.    Ordered an overnight pulse oximetry study.      LDCT ordered to be done in March 2023 for lung cancer screening.      Follow Up   Return in about 6 months (around 6/15/2023).  Patient was given instructions and counseling regarding her condition or for health maintenance advice. Please see specific information pulled into the AVS if appropriate.

## 2023-01-20 DIAGNOSIS — J44.9 CHRONIC OBSTRUCTIVE PULMONARY DISEASE, UNSPECIFIED COPD TYPE: Primary | ICD-10-CM

## 2023-02-02 ENCOUNTER — TELEPHONE (OUTPATIENT)
Dept: PULMONOLOGY | Facility: CLINIC | Age: 63
End: 2023-02-02
Payer: COMMERCIAL

## 2023-03-16 ENCOUNTER — HOSPITAL ENCOUNTER (OUTPATIENT)
Dept: MAMMOGRAPHY | Facility: HOSPITAL | Age: 63
Discharge: HOME | End: 2023-03-16
Admitting: NURSE PRACTITIONER
Payer: COMMERCIAL

## 2023-03-16 DIAGNOSIS — Z12.31 VISIT FOR SCREENING MAMMOGRAM: ICD-10-CM

## 2023-03-16 PROCEDURE — 77063 BREAST TOMOSYNTHESIS BI: CPT | Performed by: RADIOLOGY

## 2023-03-16 PROCEDURE — 77063 BREAST TOMOSYNTHESIS BI: CPT

## 2023-03-16 PROCEDURE — 77067 SCR MAMMO BI INCL CAD: CPT | Performed by: RADIOLOGY

## 2023-03-16 PROCEDURE — 77067 SCR MAMMO BI INCL CAD: CPT

## 2023-03-22 ENCOUNTER — HOSPITAL ENCOUNTER (OUTPATIENT)
Dept: CT IMAGING | Facility: HOSPITAL | Age: 63
Discharge: HOME | End: 2023-03-22
Admitting: NURSE PRACTITIONER
Payer: COMMERCIAL

## 2023-03-22 DIAGNOSIS — F17.211 CIGARETTE NICOTINE DEPENDENCE IN REMISSION: ICD-10-CM

## 2023-03-22 DIAGNOSIS — Z12.2 ENCOUNTER FOR SCREENING FOR LUNG CANCER: ICD-10-CM

## 2023-03-22 PROCEDURE — 71271 CT THORAX LUNG CANCER SCR C-: CPT

## 2023-03-22 PROCEDURE — 71271 CT THORAX LUNG CANCER SCR C-: CPT | Performed by: RADIOLOGY

## 2023-03-27 ENCOUNTER — TELEPHONE (OUTPATIENT)
Dept: PULMONOLOGY | Facility: CLINIC | Age: 63
End: 2023-03-27
Payer: COMMERCIAL

## 2023-03-27 ENCOUNTER — DOCUMENTATION (OUTPATIENT)
Dept: PULMONOLOGY | Facility: CLINIC | Age: 63
End: 2023-03-27
Payer: COMMERCIAL

## 2023-03-27 NOTE — TELEPHONE ENCOUNTER
Called and spoke with patient to report her nodule was stable and we will repeat CT in one year.     ----- Message from SAUL Amato sent at 3/27/2023  4:23 PM EDT -----  Will you let her know that her CT Chest showed that her pulmonary nodule is stable. We will repeat her CT in one year per the screening protocol.      ----- Message -----  From: Interface, Rad Results [place] In  Sent: 3/22/2023   1:22 PM EDT  To: SAUL Amato

## 2023-07-21 ENCOUNTER — LAB (OUTPATIENT)
Dept: LAB | Facility: HOSPITAL | Age: 63
End: 2023-07-21
Payer: COMMERCIAL

## 2023-07-21 DIAGNOSIS — E11.9 DIABETES MELLITUS WITHOUT COMPLICATION: ICD-10-CM

## 2023-07-21 DIAGNOSIS — E55.9 VITAMIN D DEFICIENCY DISEASE: ICD-10-CM

## 2023-07-21 DIAGNOSIS — E03.9 MYXEDEMA HEART DISEASE: ICD-10-CM

## 2023-07-21 DIAGNOSIS — E53.9 VITAMIN B DEFICIENCY: ICD-10-CM

## 2023-07-21 DIAGNOSIS — I51.9 MYXEDEMA HEART DISEASE: ICD-10-CM

## 2023-07-21 LAB
ALBUMIN SERPL-MCNC: 4 G/DL (ref 3.5–5.2)
ALBUMIN/GLOB SERPL: 1.3 G/DL
ALP SERPL-CCNC: 65 U/L (ref 39–117)
ALT SERPL W P-5'-P-CCNC: 8 U/L (ref 1–33)
ANION GAP SERPL CALCULATED.3IONS-SCNC: 10.6 MMOL/L (ref 5–15)
AST SERPL-CCNC: 14 U/L (ref 1–32)
BASOPHILS # BLD AUTO: 0.05 10*3/MM3 (ref 0–0.2)
BASOPHILS NFR BLD AUTO: 0.8 % (ref 0–1.5)
BILIRUB SERPL-MCNC: 0.3 MG/DL (ref 0–1.2)
BUN SERPL-MCNC: 14 MG/DL (ref 8–23)
BUN/CREAT SERPL: 19.4 (ref 7–25)
CALCIUM SPEC-SCNC: 9.3 MG/DL (ref 8.6–10.5)
CHLORIDE SERPL-SCNC: 103 MMOL/L (ref 98–107)
CHOLEST SERPL-MCNC: 147 MG/DL (ref 0–200)
CO2 SERPL-SCNC: 25.4 MMOL/L (ref 22–29)
CREAT SERPL-MCNC: 0.72 MG/DL (ref 0.57–1)
DEPRECATED RDW RBC AUTO: 41.1 FL (ref 37–54)
EGFRCR SERPLBLD CKD-EPI 2021: 94.1 ML/MIN/1.73
EOSINOPHIL # BLD AUTO: 0.2 10*3/MM3 (ref 0–0.4)
EOSINOPHIL NFR BLD AUTO: 3.1 % (ref 0.3–6.2)
ERYTHROCYTE [DISTWIDTH] IN BLOOD BY AUTOMATED COUNT: 13.1 % (ref 12.3–15.4)
GLOBULIN UR ELPH-MCNC: 3.1 GM/DL
GLUCOSE SERPL-MCNC: 131 MG/DL (ref 65–99)
HBA1C MFR BLD: 6.2 % (ref 4.8–5.6)
HCT VFR BLD AUTO: 42.4 % (ref 34–46.6)
HDLC SERPL-MCNC: 39 MG/DL (ref 40–60)
HGB BLD-MCNC: 13.6 G/DL (ref 12–15.9)
IMM GRANULOCYTES # BLD AUTO: 0.02 10*3/MM3 (ref 0–0.05)
IMM GRANULOCYTES NFR BLD AUTO: 0.3 % (ref 0–0.5)
LDLC SERPL CALC-MCNC: 92 MG/DL (ref 0–100)
LDLC/HDLC SERPL: 2.35 {RATIO}
LYMPHOCYTES # BLD AUTO: 2.15 10*3/MM3 (ref 0.7–3.1)
LYMPHOCYTES NFR BLD AUTO: 33.9 % (ref 19.6–45.3)
MCH RBC QN AUTO: 27.9 PG (ref 26.6–33)
MCHC RBC AUTO-ENTMCNC: 32.1 G/DL (ref 31.5–35.7)
MCV RBC AUTO: 87.1 FL (ref 79–97)
MONOCYTES # BLD AUTO: 0.42 10*3/MM3 (ref 0.1–0.9)
MONOCYTES NFR BLD AUTO: 6.6 % (ref 5–12)
NEUTROPHILS NFR BLD AUTO: 3.51 10*3/MM3 (ref 1.7–7)
NEUTROPHILS NFR BLD AUTO: 55.3 % (ref 42.7–76)
NRBC BLD AUTO-RTO: 0 /100 WBC (ref 0–0.2)
PLATELET # BLD AUTO: 264 10*3/MM3 (ref 140–450)
PMV BLD AUTO: 9.6 FL (ref 6–12)
POTASSIUM SERPL-SCNC: 4.4 MMOL/L (ref 3.5–5.2)
PROT SERPL-MCNC: 7.1 G/DL (ref 6–8.5)
RBC # BLD AUTO: 4.87 10*6/MM3 (ref 3.77–5.28)
SODIUM SERPL-SCNC: 139 MMOL/L (ref 136–145)
T4 FREE SERPL-MCNC: 1.63 NG/DL (ref 0.93–1.7)
TRIGL SERPL-MCNC: 81 MG/DL (ref 0–150)
TSH SERPL DL<=0.05 MIU/L-ACNC: 1.72 UIU/ML (ref 0.27–4.2)
VLDLC SERPL-MCNC: 16 MG/DL (ref 5–40)
WBC NRBC COR # BLD: 6.35 10*3/MM3 (ref 3.4–10.8)

## 2023-07-21 PROCEDURE — 84481 FREE ASSAY (FT-3): CPT

## 2023-07-21 PROCEDURE — 82306 VITAMIN D 25 HYDROXY: CPT

## 2023-07-21 PROCEDURE — 36415 COLL VENOUS BLD VENIPUNCTURE: CPT

## 2023-07-21 PROCEDURE — 83036 HEMOGLOBIN GLYCOSYLATED A1C: CPT

## 2023-07-21 PROCEDURE — 82607 VITAMIN B-12: CPT

## 2023-07-21 PROCEDURE — 80050 GENERAL HEALTH PANEL: CPT

## 2023-07-21 PROCEDURE — 84439 ASSAY OF FREE THYROXINE: CPT

## 2023-07-21 PROCEDURE — 80061 LIPID PANEL: CPT

## 2023-07-22 LAB
25(OH)D3 SERPL-MCNC: 32.1 NG/ML (ref 30–100)
T3FREE SERPL-MCNC: 2.88 PG/ML (ref 2–4.4)
VIT B12 BLD-MCNC: 512 PG/ML (ref 211–946)

## 2023-08-02 ENCOUNTER — OFFICE VISIT (OUTPATIENT)
Dept: PULMONOLOGY | Facility: CLINIC | Age: 63
End: 2023-08-02
Payer: COMMERCIAL

## 2023-08-02 VITALS
SYSTOLIC BLOOD PRESSURE: 108 MMHG | HEIGHT: 63 IN | WEIGHT: 143 LBS | HEART RATE: 62 BPM | DIASTOLIC BLOOD PRESSURE: 60 MMHG | TEMPERATURE: 97.5 F | OXYGEN SATURATION: 96 % | BODY MASS INDEX: 25.34 KG/M2

## 2023-08-02 DIAGNOSIS — J44.9 CHRONIC OBSTRUCTIVE PULMONARY DISEASE, UNSPECIFIED COPD TYPE: Primary | ICD-10-CM

## 2023-08-02 DIAGNOSIS — F17.211 CIGARETTE NICOTINE DEPENDENCE IN REMISSION: ICD-10-CM

## 2023-08-02 PROCEDURE — 1160F RVW MEDS BY RX/DR IN RCRD: CPT | Performed by: PHYSICIAN ASSISTANT

## 2023-08-02 PROCEDURE — 1159F MED LIST DOCD IN RCRD: CPT | Performed by: PHYSICIAN ASSISTANT

## 2023-08-02 PROCEDURE — 99214 OFFICE O/P EST MOD 30 MIN: CPT | Performed by: PHYSICIAN ASSISTANT

## 2023-08-02 RX ORDER — CELECOXIB 100 MG/1
CAPSULE ORAL
COMMUNITY
Start: 2023-07-27

## 2023-08-02 RX ORDER — GABAPENTIN 300 MG/1
CAPSULE ORAL
COMMUNITY
Start: 2023-04-22

## 2023-08-02 RX ORDER — LEVOTHYROXINE SODIUM 88 UG/1
TABLET ORAL
COMMUNITY
Start: 2023-07-27

## 2023-08-02 RX ORDER — BUDESONIDE AND FORMOTEROL FUMARATE DIHYDRATE 160; 4.5 UG/1; UG/1
AEROSOL RESPIRATORY (INHALATION)
COMMUNITY
Start: 2023-07-27

## 2024-02-08 ENCOUNTER — TRANSCRIBE ORDERS (OUTPATIENT)
Dept: ADMINISTRATIVE | Facility: HOSPITAL | Age: 64
End: 2024-02-08
Payer: COMMERCIAL

## 2024-02-08 DIAGNOSIS — Z12.31 BREAST CANCER SCREENING BY MAMMOGRAM: Primary | ICD-10-CM

## 2024-02-21 ENCOUNTER — OFFICE VISIT (OUTPATIENT)
Dept: PULMONOLOGY | Facility: CLINIC | Age: 64
End: 2024-02-21
Payer: COMMERCIAL

## 2024-02-21 VITALS
TEMPERATURE: 96.9 F | WEIGHT: 143 LBS | HEART RATE: 101 BPM | HEIGHT: 63 IN | SYSTOLIC BLOOD PRESSURE: 120 MMHG | OXYGEN SATURATION: 98 % | BODY MASS INDEX: 25.34 KG/M2 | DIASTOLIC BLOOD PRESSURE: 78 MMHG

## 2024-02-21 DIAGNOSIS — J44.9 CHRONIC OBSTRUCTIVE PULMONARY DISEASE, UNSPECIFIED COPD TYPE: ICD-10-CM

## 2024-02-21 DIAGNOSIS — E66.3 OVERWEIGHT: ICD-10-CM

## 2024-02-21 DIAGNOSIS — F17.211 CIGARETTE NICOTINE DEPENDENCE IN REMISSION: Primary | ICD-10-CM

## 2024-02-21 DIAGNOSIS — Z12.2 ENCOUNTER FOR SCREENING FOR LUNG CANCER: ICD-10-CM

## 2024-02-21 RX ORDER — CODEINE PHOSPHATE AND GUAIFENESIN 10; 100 MG/5ML; MG/5ML
SOLUTION ORAL
COMMUNITY
Start: 2023-10-31

## 2024-02-21 NOTE — PROGRESS NOTES
"Chief Complaint  Chronic obstructive pulmonary disease, unspecified COPD type    Subjective        Manuela Johnson presents to [facility] PULMONARY & CRITICAL CARE MEDICINE  History of Present Illness    Ms. Johnson is a 64 year old female with a medical history significant for arthritis, COPD, hypertension, hypothyroidism, and sleep apnea.    She presents today for follow up on COPD.  She states that she has had more shortness of breath with exertion.  She inquires about a [place]ph[place] valve.  She is currently taking Trelegy once daily and albuterol inhaler as needed.  She is also using neb treatments as needed.        Objective   Vital Signs:  /78   Pulse 101   Temp 96.9 °F (36.1 °C)   Ht 158.8 cm (62.52\")   Wt 64.9 kg (143 lb)   SpO2 98%   BMI 25.72 kg/m²   Estimated body mass index is 25.72 kg/m² as calculated from the following:    Height as of this encounter: 158.8 cm (62.52\").    Weight as of this encounter: 64.9 kg (143 lb).       BMI is >= 25 and <30. (Overweight) The following options were offered after discussion;: exercise counseling/recommendations and nutrition counseling/recommendations      Physical Exam     GENERAL APPEARANCE: Well developed, well nourished, alert and cooperative, and appears to be in no acute distress.    HEAD: normocephalic. Atraumatic.    EYES: PERRL, EOMI. Vision is grossly intact.    THROAT: Oral cavity and pharynx normal. No inflammation, swelling, exudate, or lesions.     NECK: Neck supple.  No thyromegaly.    CARDIAC: Normal S1 and S2. No S3, S4 or murmurs. Rhythm is regular.     RESPIRATORY:Bilateral air entry positive. Bilateral diminished breath sounds. No wheezing, crackles or rhonchi noted.    GI: Positive bowel sounds. Soft, nondistended, nontender.     MUSCULOSKELETAL: No significant deformity or joint abnormality. No edema. Peripheral pulses intact. No varicosities.    NEUROLOGICAL: Strength and sensation symmetric and intact throughout. "     PSYCHIATRIC: The mental examination revealed the patient was oriented to person, place, and time.     Result Review :    The following data was reviewed by: SAUL Amato on 02/21/2024:  Common labs          7/21/2023    10:46   Common Labs   Glucose 131    BUN 14    Creatinine 0.72    Sodium 139    Potassium 4.4    Chloride 103    Calcium 9.3    Albumin 4.0    Total Bilirubin 0.3    Alkaline Phosphatase 65    AST (SGOT) 14    ALT (SGPT) 8    WBC 6.35    Hemoglobin 13.6    Hematocrit 42.4    Platelets 264    Total Cholesterol 147    Triglycerides 81    HDL Cholesterol 39    LDL Cholesterol  92    Hemoglobin A1C 6.20                   Assessment and Plan     Diagnoses and all orders for this visit:    1. Cigarette nicotine dependence in remission (Primary)  -     CT chest low dose wo; Future    2. Chronic obstructive pulmonary disease, unspecified COPD type  -     Fluticasone-Umeclidin-Vilant (Trelegy Ellipta) 200-62.5-25 MCG/ACT aerosol powder ; Inhale 1 puff Daily.  Dispense: 60 each; Refill: 5  -     Ambulatory Referral to Pulmonology    3. Encounter for screening for lung cancer  -     CT chest low dose wo; Future    4. Overweight          Continue albuterol inhaler and duonebs as needed.  Continue Trelegy once daily.  Refills sent.    LDCT ordered for lung cancer screening.    Placed referral for pulmonology for consultation for [place] valve.        Follow Up     Return in about 6 months (around 8/21/2024).  Patient was given instructions and counseling regarding her condition or for health maintenance advice. Please see specific information pulled into the AVS if appropriate.

## 2024-03-26 ENCOUNTER — HOSPITAL ENCOUNTER (OUTPATIENT)
Dept: CT IMAGING | Facility: HOSPITAL | Age: 64
Discharge: HOME | End: 2024-03-26
Admitting: NURSE PRACTITIONER
Payer: COMMERCIAL

## 2024-03-26 DIAGNOSIS — F17.211 CIGARETTE NICOTINE DEPENDENCE IN REMISSION: ICD-10-CM

## 2024-03-26 DIAGNOSIS — Z12.2 ENCOUNTER FOR SCREENING FOR LUNG CANCER: ICD-10-CM

## 2024-03-26 PROCEDURE — 71271 CT THORAX LUNG CANCER SCR C-: CPT

## 2024-03-26 PROCEDURE — 71271 CT THORAX LUNG CANCER SCR C-: CPT | Performed by: RADIOLOGY

## 2024-03-29 ENCOUNTER — DOCUMENTATION (OUTPATIENT)
Dept: PULMONOLOGY | Facility: CLINIC | Age: 64
End: 2024-03-29
Payer: COMMERCIAL

## 2024-03-29 NOTE — LETTER
Manuela Johnson  [address]  [facility] [address]    March 29, 2024     Dear Ms. Johnson:    Below are the results from your recent low dose CT Chest:    Previously seen pulmonary nodule is stable. No new pulmonary nodule is noted.  We will plan to repeat CT Chest in one  year per screening guidelines.     If you have any questions or concerns, please don't hesitate to call.         Sincerely,        Domi Eid, APRN

## 2024-03-29 NOTE — PROGRESS NOTES
LDCT was reviewed.  Lung Rads Category 2.  Stable pulmonary nodule noted.  We will repeat CT chest in one year per the screening protocol.

## 2024-04-08 ENCOUNTER — TELEPHONE (OUTPATIENT)
Dept: PULMONOLOGY | Facility: CLINIC | Age: 64
End: 2024-04-08

## 2024-06-13 ENCOUNTER — HOSPITAL ENCOUNTER (EMERGENCY)
Facility: HOSPITAL | Age: 64
Discharge: HOME | End: 2024-06-13
Attending: STUDENT IN AN ORGANIZED HEALTH CARE EDUCATION/TRAINING PROGRAM
Payer: COMMERCIAL

## 2024-06-13 ENCOUNTER — APPOINTMENT (OUTPATIENT)
Dept: GENERAL RADIOLOGY | Facility: HOSPITAL | Age: 64
End: 2024-06-13
Payer: COMMERCIAL

## 2024-06-13 ENCOUNTER — APPOINTMENT (OUTPATIENT)
Dept: CT IMAGING | Facility: HOSPITAL | Age: 64
End: 2024-06-13
Payer: COMMERCIAL

## 2024-06-13 VITALS
DIASTOLIC BLOOD PRESSURE: 89 MMHG | HEART RATE: 80 BPM | TEMPERATURE: 98.1 F | SYSTOLIC BLOOD PRESSURE: 138 MMHG | WEIGHT: 130 LBS | RESPIRATION RATE: 18 BRPM | HEIGHT: 62 IN | BODY MASS INDEX: 23.92 KG/M2 | OXYGEN SATURATION: 99 %

## 2024-06-13 DIAGNOSIS — N39.0 URINARY TRACT INFECTION WITHOUT HEMATURIA, SITE UNSPECIFIED: Primary | ICD-10-CM

## 2024-06-13 LAB
027 TOXIN: NORMAL
ADV 40+41 DNA STL QL NAA+NON-PROBE: NOT DETECTED
ALBUMIN SERPL-MCNC: 3.2 G/DL (ref 3.5–5.2)
ALBUMIN/GLOB SERPL: 0.9 G/DL
ALP SERPL-CCNC: 78 U/L (ref 39–117)
ALT SERPL W P-5'-P-CCNC: 9 U/L (ref 1–33)
ANION GAP SERPL CALCULATED.3IONS-SCNC: 10.5 MMOL/L (ref 5–15)
ANION GAP SERPL CALCULATED.3IONS-SCNC: 15.2 MMOL/L (ref 5–15)
AST SERPL-CCNC: 11 U/L (ref 1–32)
ASTRO TYP 1-8 RNA STL QL NAA+NON-PROBE: NOT DETECTED
BACTERIA UR QL AUTO: ABNORMAL /HPF
BASOPHILS # BLD AUTO: 0.03 10*3/MM3 (ref 0–0.2)
BASOPHILS NFR BLD AUTO: 0.3 % (ref 0–1.5)
BILIRUB SERPL-MCNC: 0.4 MG/DL (ref 0–1.2)
BILIRUB UR QL STRIP: NEGATIVE
BUN SERPL-MCNC: 10 MG/DL (ref 8–23)
BUN SERPL-MCNC: 11 MG/DL (ref 8–23)
BUN/CREAT SERPL: 12.9 (ref 7–25)
BUN/CREAT SERPL: 13.2 (ref 7–25)
C CAYETANENSIS DNA STL QL NAA+NON-PROBE: NOT DETECTED
C COLI+JEJ+UPSA DNA STL QL NAA+NON-PROBE: NOT DETECTED
C DIFF TOX GENS STL QL NAA+PROBE: NEGATIVE
CALCIUM SPEC-SCNC: 8.4 MG/DL (ref 8.6–10.5)
CALCIUM SPEC-SCNC: 9.4 MG/DL (ref 8.6–10.5)
CHLORIDE SERPL-SCNC: 101 MMOL/L (ref 98–107)
CHLORIDE SERPL-SCNC: 93 MMOL/L (ref 98–107)
CLARITY UR: CLEAR
CO2 SERPL-SCNC: 25.5 MMOL/L (ref 22–29)
CO2 SERPL-SCNC: 25.8 MMOL/L (ref 22–29)
COLOR UR: YELLOW
CREAT SERPL-MCNC: 0.76 MG/DL (ref 0.57–1)
CREAT SERPL-MCNC: 0.85 MG/DL (ref 0.57–1)
CRP SERPL-MCNC: 30.52 MG/DL (ref 0–0.5)
CRYPTOSP DNA STL QL NAA+NON-PROBE: NOT DETECTED
D-LACTATE SERPL-SCNC: 1.2 MMOL/L (ref 0.5–2)
DEPRECATED RDW RBC AUTO: 40.3 FL (ref 37–54)
E HISTOLYT DNA STL QL NAA+NON-PROBE: NOT DETECTED
EAEC PAA PLAS AGGR+AATA ST NAA+NON-PRB: NOT DETECTED
EC STX1+STX2 GENES STL QL NAA+NON-PROBE: NOT DETECTED
EGFRCR SERPLBLD CKD-EPI 2021: 76.6 ML/MIN/1.73
EGFRCR SERPLBLD CKD-EPI 2021: 87.6 ML/MIN/1.73
EOSINOPHIL # BLD AUTO: 0.05 10*3/MM3 (ref 0–0.4)
EOSINOPHIL NFR BLD AUTO: 0.4 % (ref 0.3–6.2)
EPEC EAE GENE STL QL NAA+NON-PROBE: NOT DETECTED
ERYTHROCYTE [DISTWIDTH] IN BLOOD BY AUTOMATED COUNT: 13.1 % (ref 12.3–15.4)
ERYTHROCYTE [SEDIMENTATION RATE] IN BLOOD: 111 MM/HR (ref 0–30)
ETEC LTA+ST1A+ST1B TOX ST NAA+NON-PROBE: NOT DETECTED
G LAMBLIA DNA STL QL NAA+NON-PROBE: NOT DETECTED
GLOBULIN UR ELPH-MCNC: 3.6 GM/DL
GLUCOSE SERPL-MCNC: 111 MG/DL (ref 65–99)
GLUCOSE SERPL-MCNC: 129 MG/DL (ref 65–99)
GLUCOSE UR STRIP-MCNC: NEGATIVE MG/DL
HCT VFR BLD AUTO: 34.7 % (ref 34–46.6)
HGB BLD-MCNC: 11.8 G/DL (ref 12–15.9)
HGB UR QL STRIP.AUTO: NEGATIVE
HOLD SPECIMEN: NORMAL
HOLD SPECIMEN: NORMAL
HYALINE CASTS UR QL AUTO: ABNORMAL /LPF
IMM GRANULOCYTES # BLD AUTO: 0.17 10*3/MM3 (ref 0–0.05)
IMM GRANULOCYTES NFR BLD AUTO: 1.4 % (ref 0–0.5)
KETONES UR QL STRIP: NEGATIVE
LEUKOCYTE ESTERASE UR QL STRIP.AUTO: ABNORMAL
LIPASE SERPL-CCNC: 27 U/L (ref 13–60)
LYMPHOCYTES # BLD AUTO: 1.01 10*3/MM3 (ref 0.7–3.1)
LYMPHOCYTES NFR BLD AUTO: 8.6 % (ref 19.6–45.3)
MAGNESIUM SERPL-MCNC: 2.1 MG/DL (ref 1.6–2.4)
MCH RBC QN AUTO: 28.7 PG (ref 26.6–33)
MCHC RBC AUTO-ENTMCNC: 34 G/DL (ref 31.5–35.7)
MCV RBC AUTO: 84.4 FL (ref 79–97)
MONOCYTES # BLD AUTO: 0.65 10*3/MM3 (ref 0.1–0.9)
MONOCYTES NFR BLD AUTO: 5.5 % (ref 5–12)
NEUTROPHILS NFR BLD AUTO: 83.8 % (ref 42.7–76)
NEUTROPHILS NFR BLD AUTO: 9.9 10*3/MM3 (ref 1.7–7)
NITRITE UR QL STRIP: POSITIVE
NOROVIRUS GI+II RNA STL QL NAA+NON-PROBE: NOT DETECTED
NRBC BLD AUTO-RTO: 0 /100 WBC (ref 0–0.2)
P SHIGELLOIDES DNA STL QL NAA+NON-PROBE: NOT DETECTED
PH UR STRIP.AUTO: 6.5 [PH] (ref 5–8)
PLATELET # BLD AUTO: 346 10*3/MM3 (ref 140–450)
PMV BLD AUTO: 9.5 FL (ref 6–12)
POTASSIUM SERPL-SCNC: 2.8 MMOL/L (ref 3.5–5.2)
POTASSIUM SERPL-SCNC: 3.6 MMOL/L (ref 3.5–5.2)
PROT SERPL-MCNC: 6.8 G/DL (ref 6–8.5)
PROT UR QL STRIP: ABNORMAL
QT INTERVAL: 438 MS
QTC INTERVAL: 459 MS
RBC # BLD AUTO: 4.11 10*6/MM3 (ref 3.77–5.28)
RBC # UR STRIP: ABNORMAL /HPF
REF LAB TEST METHOD: ABNORMAL
RVA RNA STL QL NAA+NON-PROBE: NOT DETECTED
S ENT+BONG DNA STL QL NAA+NON-PROBE: NOT DETECTED
SAPO I+II+IV+V RNA STL QL NAA+NON-PROBE: NOT DETECTED
SHIGELLA SP+EIEC IPAH ST NAA+NON-PROBE: NOT DETECTED
SODIUM SERPL-SCNC: 134 MMOL/L (ref 136–145)
SODIUM SERPL-SCNC: 137 MMOL/L (ref 136–145)
SP GR UR STRIP: 1.01 (ref 1–1.03)
SQUAMOUS #/AREA URNS HPF: ABNORMAL /HPF
T4 FREE SERPL-MCNC: 1.62 NG/DL (ref 0.93–1.7)
TROPONIN T SERPL HS-MCNC: 12 NG/L
TSH SERPL DL<=0.05 MIU/L-ACNC: 1.98 UIU/ML (ref 0.27–4.2)
UROBILINOGEN UR QL STRIP: ABNORMAL
V CHOL+PARA+VUL DNA STL QL NAA+NON-PROBE: NOT DETECTED
V CHOLERAE DNA STL QL NAA+NON-PROBE: NOT DETECTED
WBC # UR STRIP: ABNORMAL /HPF
WBC NRBC COR # BLD AUTO: 11.81 10*3/MM3 (ref 3.4–10.8)
WHOLE BLOOD HOLD COAG: NORMAL
WHOLE BLOOD HOLD SPECIMEN: NORMAL
Y ENTEROCOL DNA STL QL NAA+NON-PROBE: NOT DETECTED

## 2024-06-13 PROCEDURE — 99285 EMERGENCY DEPT VISIT HI MDM: CPT

## 2024-06-13 PROCEDURE — 85652 RBC SED RATE AUTOMATED: CPT

## 2024-06-13 PROCEDURE — 36415 COLL VENOUS BLD VENIPUNCTURE: CPT

## 2024-06-13 PROCEDURE — 96375 TX/PRO/DX INJ NEW DRUG ADDON: CPT

## 2024-06-13 PROCEDURE — 83690 ASSAY OF LIPASE: CPT

## 2024-06-13 PROCEDURE — 80050 GENERAL HEALTH PANEL: CPT

## 2024-06-13 PROCEDURE — 87493 C DIFF AMPLIFIED PROBE: CPT

## 2024-06-13 PROCEDURE — 80048 BASIC METABOLIC PNL TOTAL CA: CPT

## 2024-06-13 PROCEDURE — 74177 CT ABD & PELVIS W/CONTRAST: CPT

## 2024-06-13 PROCEDURE — 83605 ASSAY OF LACTIC ACID: CPT

## 2024-06-13 PROCEDURE — 96365 THER/PROPH/DIAG IV INF INIT: CPT

## 2024-06-13 PROCEDURE — 93010 ELECTROCARDIOGRAM REPORT: CPT | Performed by: INTERNAL MEDICINE

## 2024-06-13 PROCEDURE — 87077 CULTURE AEROBIC IDENTIFY: CPT

## 2024-06-13 PROCEDURE — 87507 IADNA-DNA/RNA PROBE TQ 12-25: CPT

## 2024-06-13 PROCEDURE — 87086 URINE CULTURE/COLONY COUNT: CPT

## 2024-06-13 PROCEDURE — 25810000003 SODIUM CHLORIDE 0.9 % SOLUTION

## 2024-06-13 PROCEDURE — 74177 CT ABD & PELVIS W/CONTRAST: CPT | Performed by: RADIOLOGY

## 2024-06-13 PROCEDURE — 84484 ASSAY OF TROPONIN QUANT: CPT

## 2024-06-13 PROCEDURE — 25010000002 CEFTRIAXONE PER 250 MG

## 2024-06-13 PROCEDURE — 86140 C-REACTIVE PROTEIN: CPT

## 2024-06-13 PROCEDURE — 93005 ELECTROCARDIOGRAM TRACING: CPT

## 2024-06-13 PROCEDURE — 70450 CT HEAD/BRAIN W/O DYE: CPT

## 2024-06-13 PROCEDURE — 25010000002 ONDANSETRON PER 1 MG

## 2024-06-13 PROCEDURE — 71045 X-RAY EXAM CHEST 1 VIEW: CPT

## 2024-06-13 PROCEDURE — 87154 CUL TYP ID BLD PTHGN 6+ TRGT: CPT

## 2024-06-13 PROCEDURE — 71045 X-RAY EXAM CHEST 1 VIEW: CPT | Performed by: RADIOLOGY

## 2024-06-13 PROCEDURE — 25510000001 IOPAMIDOL 61 % SOLUTION: Performed by: STUDENT IN AN ORGANIZED HEALTH CARE EDUCATION/TRAINING PROGRAM

## 2024-06-13 PROCEDURE — 70450 CT HEAD/BRAIN W/O DYE: CPT | Performed by: RADIOLOGY

## 2024-06-13 PROCEDURE — 81001 URINALYSIS AUTO W/SCOPE: CPT

## 2024-06-13 PROCEDURE — 83735 ASSAY OF MAGNESIUM: CPT

## 2024-06-13 PROCEDURE — 84439 ASSAY OF FREE THYROXINE: CPT

## 2024-06-13 PROCEDURE — 87186 SC STD MICRODIL/AGAR DIL: CPT

## 2024-06-13 PROCEDURE — 87040 BLOOD CULTURE FOR BACTERIA: CPT

## 2024-06-13 RX ORDER — ONDANSETRON 2 MG/ML
4 INJECTION INTRAMUSCULAR; INTRAVENOUS ONCE
Status: COMPLETED | OUTPATIENT
Start: 2024-06-13 | End: 2024-06-13

## 2024-06-13 RX ORDER — POTASSIUM CHLORIDE 20 MEQ/1
20 TABLET, EXTENDED RELEASE ORAL ONCE
Status: COMPLETED | OUTPATIENT
Start: 2024-06-13 | End: 2024-06-13

## 2024-06-13 RX ORDER — SODIUM CHLORIDE 0.9 % (FLUSH) 0.9 %
10 SYRINGE (ML) INJECTION AS NEEDED
Status: DISCONTINUED | OUTPATIENT
Start: 2024-06-13 | End: 2024-06-13 | Stop reason: HOSPADM

## 2024-06-13 RX ORDER — CEFDINIR 300 MG/1
300 CAPSULE ORAL 2 TIMES DAILY
Qty: 20 CAPSULE | Refills: 0 | OUTPATIENT
Start: 2024-06-13 | End: 2024-06-15

## 2024-06-13 RX ORDER — POTASSIUM CHLORIDE 1.5 G/1.58G
40 POWDER, FOR SOLUTION ORAL ONCE
Status: COMPLETED | OUTPATIENT
Start: 2024-06-13 | End: 2024-06-13

## 2024-06-13 RX ADMIN — SODIUM CHLORIDE 1000 ML: 9 INJECTION, SOLUTION INTRAVENOUS at 11:26

## 2024-06-13 RX ADMIN — CEFTRIAXONE 1000 MG: 1 INJECTION, POWDER, FOR SOLUTION INTRAMUSCULAR; INTRAVENOUS at 14:28

## 2024-06-13 RX ADMIN — POTASSIUM CHLORIDE 40 MEQ: 1.5 POWDER, FOR SOLUTION ORAL at 12:07

## 2024-06-13 RX ADMIN — ONDANSETRON 4 MG: 2 INJECTION INTRAMUSCULAR; INTRAVENOUS at 11:26

## 2024-06-13 RX ADMIN — IOPAMIDOL 100 ML: 612 INJECTION, SOLUTION INTRAVENOUS at 12:17

## 2024-06-13 RX ADMIN — POTASSIUM CHLORIDE 20 MEQ: 1500 TABLET, EXTENDED RELEASE ORAL at 12:07

## 2024-06-13 NOTE — ED PROVIDER NOTES
Subjective   History of Present Illness  Patient is a 64-year-old female with past medical history of hypertension, hypothyroidism, COPD, and arthritis.  Patient reports she has been feeling generally ill and weak for approximately 1 week with abdominal soreness and nausea.  Patient denies vomiting.  Patient reports that she has been running a fever up to 102.5 that Tylenol does help.  Patient reports she also fell approximately 1 week ago and does not know if she hit her head.  Patient reports that she is felt disoriented since falling.  Patient is alert and oriented today and able to answer all questions appropriately.  Patient reports she also hit her left arm and left hip during the fall.  Patient reports the pain in the left hip and the left arm has resolved and there is no bruising noted at this time.  Patient presents private vehicle with  at bedside.        Review of Systems   Constitutional: Negative.  Positive for fever.   HENT: Negative.     Respiratory: Negative.     Cardiovascular: Negative.  Negative for chest pain.   Gastrointestinal: Negative.  Positive for abdominal pain and nausea.   Endocrine: Negative.    Genitourinary: Negative.  Negative for dysuria.   Skin: Negative.    Neurological: Negative.  Positive for weakness.   Psychiatric/Behavioral: Negative.     All other systems reviewed and are negative.      Past Medical History:   Diagnosis Date    Arthritis     Cancer     melanoma    COPD (chronic obstructive pulmonary disease)     History of transfusion     Hypertension     Hypothyroid     MRSA (methicillin resistant Staphylococcus aureus) carrier     2017    Sleep apnea        No Known Allergies    Past Surgical History:   Procedure Laterality Date    ANTERIOR VAGINAL REPAIR      BREAST CYST EXCISION Right 1992    benign    CHOLECYSTECTOMY N/A 4/15/2019    Procedure: CHOLECYSTECTOMY LAPAROSCOPIC with liver biopsy;  Surgeon: Srinivas Perez MD;  Location: [facility];  Service: General     COLONOSCOPY N/A 4/15/2019    Procedure: COLONOSCOPY;  Surgeon: Srinivas Perez MD;  Location:  [facility];  Service: General    ESSURE TUBAL LIGATION      INCISION AND DRAINAGE ABSCESS      INCISION AND DRAINAGE ARM Left 8/15/2017    Procedure: INCISION AND DRAINAGE ABSCESS;  Surgeon: Ramakrishna Waldrop MD;  Location:  [facility] OR;  Service:        Family History   Problem Relation Age of Onset    Hypertension Father     Cancer Father     Hypertension Brother     Cancer Brother     Breast cancer Neg Hx        Social History     Socioeconomic History    Marital status:    Tobacco Use    Smoking status: Former     Current packs/day: 0.00     Average packs/day: 1 pack/day for 50.0 years (50.0 ttl pk-yrs)     Types: Cigarettes     Start date: 1968     Quit date: 2018     Years since quittin.5     Passive exposure: Current    Smokeless tobacco: Never   Vaping Use    Vaping status: Some Days    Substances: Nicotine, Flavoring    Devices: Refillable tank   Substance and Sexual Activity    Alcohol use: No    Drug use: No    Sexual activity: Defer           Objective   Physical Exam  Vitals and nursing note reviewed.   Constitutional:       General: She is not in acute distress.     Appearance: She is well-developed. She is not diaphoretic.   HENT:      Head: Normocephalic and atraumatic.      Right Ear: External ear normal.      Left Ear: External ear normal.      Nose: Nose normal.   Eyes:      Conjunctiva/sclera: Conjunctivae normal.      Pupils: Pupils are equal, round, and reactive to light.   Neck:      Vascular: No JVD.      Trachea: No tracheal deviation.   Cardiovascular:      Rate and Rhythm: Normal rate and regular rhythm.      Heart sounds: Normal heart sounds. No murmur heard.  Pulmonary:      Effort: Pulmonary effort is normal. No respiratory distress.      Breath sounds: Normal breath sounds. No wheezing.   Abdominal:      General: Bowel sounds are normal.      Palpations: Abdomen is soft.       Tenderness: There is generalized abdominal tenderness.   Musculoskeletal:         General: No deformity. Normal range of motion.      Cervical back: Normal range of motion and neck supple.   Skin:     General: Skin is warm and dry.      Coloration: Skin is not pale.      Findings: No erythema or rash.   Neurological:      Mental Status: She is alert and oriented to person, place, and time.      Cranial Nerves: No cranial nerve deficit.   Psychiatric:         Behavior: Behavior normal.         Thought Content: Thought content normal.     CT Head Without Contrast    Result Date: 6/13/2024    The tentorium cerebellum and falx is seen to be hyperdense and likely related to previous contrast bolus for CT abdomen pelvis.   This report was finalized on 6/13/2024 1:52 PM by Dr. Edvin Marin MD.      CT Abdomen Pelvis With Contrast    Result Date: 6/13/2024    Tiny free fluid nonspecific.   This report was finalized on 6/13/2024 12:43 PM by Dr. Edvin Marin MD.      XR Chest 1 View    Result Date: 6/13/2024    No acute cardiopulmonary findings identified.   This report was finalized on 6/13/2024 12:45 PM by Dr. Edvin Marin MD.     Results for orders placed or performed during the hospital encounter of 06/13/24   Gastrointestinal Panel, PCR - Stool, Per Rectum    Specimen: Per Rectum; Stool   Result Value Ref Range    Campylobacter Not Detected Not Detected    Plesiomonas shigelloides Not Detected Not Detected    Salmonella Not Detected Not Detected    Vibrio Not Detected Not Detected    Vibrio cholerae Not Detected Not Detected    Yersinia enterocolitica Not Detected Not Detected    Enteroaggregative E. coli (EAEC) Not Detected Not Detected    Enteropathogenic E. coli (EPEC) Not Detected Not Detected    Enterotoxigenic E. coli (ETEC) lt/st Not Detected Not Detected    Shiga-like toxin-producing E. coli (STEC) stx1/stx2 Not Detected Not Detected    Shigella/Enteroinvasive E. coli (EIEC) Not Detected Not Detected     Cryptosporidium Not Detected Not Detected    Cyclospora cayetanensis Not Detected Not Detected    Entamoeba histolytica Not Detected Not Detected    Giardia lamblia Not Detected Not Detected    Adenovirus F40/41 Not Detected Not Detected    Astrovirus Not Detected Not Detected    Norovirus GI/GII Not Detected Not Detected    Rotavirus A Not Detected Not Detected    Sapovirus (I, II, IV or V) Not Detected Not Detected   Clostridioides difficile Toxin, PCR - Stool, Per Rectum    Specimen: Per Rectum; Stool   Result Value Ref Range    Toxigenic C. difficile by PCR Negative Negative    027 Toxin Presumptive Negative    Comprehensive Metabolic Panel    Specimen: Blood   Result Value Ref Range    Glucose 129 (H) 65 - 99 mg/dL    BUN 11 8 - 23 mg/dL    Creatinine 0.85 0.57 - 1.00 mg/dL    Sodium 134 (L) 136 - 145 mmol/L    Potassium 2.8 (L) 3.5 - 5.2 mmol/L    Chloride 93 (L) 98 - 107 mmol/L    CO2 25.8 22.0 - 29.0 mmol/L    Calcium 9.4 8.6 - 10.5 mg/dL    Total Protein 6.8 6.0 - 8.5 g/dL    Albumin 3.2 (L) 3.5 - 5.2 g/dL    ALT (SGPT) 9 1 - 33 U/L    AST (SGOT) 11 1 - 32 U/L    Alkaline Phosphatase 78 39 - 117 U/L    Total Bilirubin 0.4 0.0 - 1.2 mg/dL    Globulin 3.6 gm/dL    A/G Ratio 0.9 g/dL    BUN/Creatinine Ratio 12.9 7.0 - 25.0    Anion Gap 15.2 (H) 5.0 - 15.0 mmol/L    eGFR 76.6 >60.0 mL/min/1.73   Lipase    Specimen: Blood   Result Value Ref Range    Lipase 27 13 - 60 U/L   Urinalysis With Culture If Indicated - Urine, Clean Catch    Specimen: Urine, Clean Catch   Result Value Ref Range    Color, UA Yellow Yellow, Straw    Appearance, UA Clear Clear    pH, UA 6.5 5.0 - 8.0    Specific Gravity, UA 1.006 1.005 - 1.030    Glucose, UA Negative Negative    Ketones, UA Negative Negative    Bilirubin, UA Negative Negative    Blood, UA Negative Negative    Protein, UA Trace (A) Negative    Leuk Esterase, UA Small (1+) (A) Negative    Nitrite, UA Positive (A) Negative    Urobilinogen, UA 0.2 E.U./dL 0.2 - 1.0 E.U./dL    Lactic Acid, Plasma    Specimen: Blood   Result Value Ref Range    Lactate 1.2 0.5 - 2.0 mmol/L   Single High Sensitivity Troponin T    Specimen: Blood   Result Value Ref Range    HS Troponin T 12 <14 ng/L   TSH    Specimen: Blood   Result Value Ref Range    TSH 1.980 0.270 - 4.200 uIU/mL   T4, Free    Specimen: Blood   Result Value Ref Range    Free T4 1.62 0.93 - 1.70 ng/dL   Magnesium    Specimen: Blood   Result Value Ref Range    Magnesium 2.1 1.6 - 2.4 mg/dL   C-reactive Protein    Specimen: Blood   Result Value Ref Range    C-Reactive Protein 30.52 (H) 0.00 - 0.50 mg/dL   Sedimentation Rate    Specimen: Blood   Result Value Ref Range    Sed Rate 111 (H) 0 - 30 mm/hr   CBC Auto Differential    Specimen: Blood   Result Value Ref Range    WBC 11.81 (H) 3.40 - 10.80 10*3/mm3    RBC 4.11 3.77 - 5.28 10*6/mm3    Hemoglobin 11.8 (L) 12.0 - 15.9 g/dL    Hematocrit 34.7 34.0 - 46.6 %    MCV 84.4 79.0 - 97.0 fL    MCH 28.7 26.6 - 33.0 pg    MCHC 34.0 31.5 - 35.7 g/dL    RDW 13.1 12.3 - 15.4 %    RDW-SD 40.3 37.0 - 54.0 fl    MPV 9.5 6.0 - 12.0 fL    Platelets 346 140 - 450 10*3/mm3    Neutrophil % 83.8 (H) 42.7 - 76.0 %    Lymphocyte % 8.6 (L) 19.6 - 45.3 %    Monocyte % 5.5 5.0 - 12.0 %    Eosinophil % 0.4 0.3 - 6.2 %    Basophil % 0.3 0.0 - 1.5 %    Immature Grans % 1.4 (H) 0.0 - 0.5 %    Neutrophils, Absolute 9.90 (H) 1.70 - 7.00 10*3/mm3    Lymphocytes, Absolute 1.01 0.70 - 3.10 10*3/mm3    Monocytes, Absolute 0.65 0.10 - 0.90 10*3/mm3    Eosinophils, Absolute 0.05 0.00 - 0.40 10*3/mm3    Basophils, Absolute 0.03 0.00 - 0.20 10*3/mm3    Immature Grans, Absolute 0.17 (H) 0.00 - 0.05 10*3/mm3    nRBC 0.0 0.0 - 0.2 /100 WBC   Urinalysis, Microscopic Only - Urine, Clean Catch    Specimen: Urine, Clean Catch   Result Value Ref Range    RBC, UA 3-5 (A) None Seen, 0-2 /HPF    WBC, UA 6-10 (A) None Seen, 0-2 /HPF    Bacteria, UA 2+ (A) None Seen /HPF    Squamous Epithelial Cells, UA 0-2 None Seen, 0-2 /HPF    Hyaline  Casts, UA None Seen None Seen /LPF    Methodology Manual Light Microscopy    Basic Metabolic Panel    Specimen: Arm, Left; Blood   Result Value Ref Range    Glucose 111 (H) 65 - 99 mg/dL    BUN 10 8 - 23 mg/dL    Creatinine 0.76 0.57 - 1.00 mg/dL    Sodium 137 136 - 145 mmol/L    Potassium 3.6 3.5 - 5.2 mmol/L    Chloride 101 98 - 107 mmol/L    CO2 25.5 22.0 - 29.0 mmol/L    Calcium 8.4 (L) 8.6 - 10.5 mg/dL    BUN/Creatinine Ratio 13.2 7.0 - 25.0    Anion Gap 10.5 5.0 - 15.0 mmol/L    eGFR 87.6 >60.0 mL/min/1.73   ECG 12 Lead Altered Mental Status   Result Value Ref Range    QT Interval 438 ms    QTC Interval 459 ms   Green Top (Gel)   Result Value Ref Range    Extra Tube Hold for add-ons.    Lavender Top   Result Value Ref Range    Extra Tube hold for add-on    Gold Top - SST   Result Value Ref Range    Extra Tube Hold for add-ons.    Light Blue Top   Result Value Ref Range    Extra Tube Hold for add-ons.          Procedures           ED Course  ED Course as of 06/13/24 1525   Thu Jun 13, 2024   1132 EKG notes sinus rhythm.  66 bpm.  I directly evaluated the EKG of this patient and noted no acute abnormalities.  Electronically signed by Win Solano DO, 06/13/24, 11:32 AM EDT.   [SF]      ED Course User Index  [SF] Win Solano DO                                             Medical Decision Making  Patient is a 64-year-old female with past medical history of hypertension, hypothyroidism, COPD, and arthritis.  Patient reports she has been feeling generally ill and weak for approximately 1 week with abdominal soreness and nausea.  Patient denies vomiting.  Patient reports that she has been running a fever up to 102.5 that Tylenol does help.  Patient reports she also fell approximately 1 week ago and does not know if she hit her head.  Patient reports that she is felt disoriented since falling.  Patient is alert and oriented today and able to answer all questions appropriately.  Patient reports she also hit her  left arm and left hip during the fall.  Patient reports the pain in the left hip and the left arm has resolved and there is no bruising noted at this time.  Patient presents private vehicle with  at bedside.    Problems Addressed:  Urinary tract infection without hematuria, site unspecified: complicated acute illness or injury    Amount and/or Complexity of Data Reviewed  Labs: ordered.  Radiology: ordered.  ECG/medicine tests: ordered.    Risk  Prescription drug management.        Final diagnoses:   Urinary tract infection without hematuria, site unspecified       ED Disposition  ED Disposition       ED Disposition   Discharge    Condition   Stable    Comment   --               Eva Castro, APRN  [address] [place], [place] [id number]  [phone]               Medication List        New Prescriptions      cefdinir 300 MG capsule  Commonly known as: OMNICEF  Take 1 capsule by mouth 2 (Two) Times a Day.               Where to Get Your Medications        You can get these medications from any pharmacy    Bring a paper prescription for each of these medications  cefdinir 300 MG capsule            Kat Goldstein, APRN  06/13/24 1525

## 2024-06-14 LAB
BACTERIA BLD CULT: ABNORMAL
BOTTLE TYPE: ABNORMAL

## 2024-06-15 ENCOUNTER — HOSPITAL ENCOUNTER (EMERGENCY)
Facility: HOSPITAL | Age: 64
Discharge: HOME | End: 2024-06-15
Attending: EMERGENCY MEDICINE
Payer: COMMERCIAL

## 2024-06-15 VITALS
TEMPERATURE: 98.2 F | RESPIRATION RATE: 14 BRPM | OXYGEN SATURATION: 96 % | DIASTOLIC BLOOD PRESSURE: 87 MMHG | WEIGHT: 130 LBS | SYSTOLIC BLOOD PRESSURE: 133 MMHG | BODY MASS INDEX: 23.04 KG/M2 | HEIGHT: 63 IN | HEART RATE: 73 BPM

## 2024-06-15 DIAGNOSIS — N39.0 ACUTE UTI: Primary | ICD-10-CM

## 2024-06-15 LAB
ALBUMIN SERPL-MCNC: 3.1 G/DL (ref 3.5–5.2)
ALBUMIN/GLOB SERPL: 0.9 G/DL
ALP SERPL-CCNC: 67 U/L (ref 39–117)
ALT SERPL W P-5'-P-CCNC: 11 U/L (ref 1–33)
ANION GAP SERPL CALCULATED.3IONS-SCNC: 14.2 MMOL/L (ref 5–15)
AST SERPL-CCNC: 15 U/L (ref 1–32)
BACTERIA SPEC AEROBE CULT: ABNORMAL
BACTERIA UR QL AUTO: ABNORMAL /HPF
BASOPHILS # BLD MANUAL: 0.08 10*3/MM3 (ref 0–0.2)
BASOPHILS NFR BLD MANUAL: 1 % (ref 0–1.5)
BILIRUB SERPL-MCNC: 0.3 MG/DL (ref 0–1.2)
BILIRUB UR QL STRIP: NEGATIVE
BUN SERPL-MCNC: 14 MG/DL (ref 8–23)
BUN/CREAT SERPL: 19.2 (ref 7–25)
CALCIUM SPEC-SCNC: 9 MG/DL (ref 8.6–10.5)
CHLORIDE SERPL-SCNC: 94 MMOL/L (ref 98–107)
CLARITY UR: CLEAR
CO2 SERPL-SCNC: 28.8 MMOL/L (ref 22–29)
COLOR UR: YELLOW
CREAT SERPL-MCNC: 0.73 MG/DL (ref 0.57–1)
CRP SERPL-MCNC: 17.26 MG/DL (ref 0–0.5)
D-LACTATE SERPL-SCNC: 1 MMOL/L (ref 0.5–2)
DEPRECATED RDW RBC AUTO: 42.5 FL (ref 37–54)
EGFRCR SERPLBLD CKD-EPI 2021: 92 ML/MIN/1.73
ERYTHROCYTE [DISTWIDTH] IN BLOOD BY AUTOMATED COUNT: 13.4 % (ref 12.3–15.4)
GLOBULIN UR ELPH-MCNC: 3.5 GM/DL
GLUCOSE SERPL-MCNC: 106 MG/DL (ref 65–99)
GLUCOSE UR STRIP-MCNC: NEGATIVE MG/DL
HCT VFR BLD AUTO: 33.6 % (ref 34–46.6)
HGB BLD-MCNC: 11.2 G/DL (ref 12–15.9)
HGB UR QL STRIP.AUTO: NEGATIVE
HYALINE CASTS UR QL AUTO: ABNORMAL /LPF
KETONES UR QL STRIP: NEGATIVE
LEUKOCYTE ESTERASE UR QL STRIP.AUTO: ABNORMAL
LYMPHOCYTES # BLD MANUAL: 1.36 10*3/MM3 (ref 0.7–3.1)
LYMPHOCYTES NFR BLD MANUAL: 2 % (ref 5–12)
MCH RBC QN AUTO: 28.9 PG (ref 26.6–33)
MCHC RBC AUTO-ENTMCNC: 33.3 G/DL (ref 31.5–35.7)
MCV RBC AUTO: 86.8 FL (ref 79–97)
MONOCYTES # BLD: 0.15 10*3/MM3 (ref 0.1–0.9)
MYELOCYTES NFR BLD MANUAL: 1 % (ref 0–0)
NEUTROPHILS # BLD AUTO: 5.9 10*3/MM3 (ref 1.7–7)
NEUTROPHILS NFR BLD MANUAL: 78 % (ref 42.7–76)
NITRITE UR QL STRIP: NEGATIVE
PH UR STRIP.AUTO: 6 [PH] (ref 5–8)
PLATELET # BLD AUTO: 461 10*3/MM3 (ref 140–450)
PMV BLD AUTO: 9.4 FL (ref 6–12)
POTASSIUM SERPL-SCNC: 2.9 MMOL/L (ref 3.5–5.2)
PROCALCITONIN SERPL-MCNC: 0.15 NG/ML (ref 0–0.25)
PROT SERPL-MCNC: 6.6 G/DL (ref 6–8.5)
PROT UR QL STRIP: NEGATIVE
RBC # BLD AUTO: 3.87 10*6/MM3 (ref 3.77–5.28)
RBC # UR STRIP: ABNORMAL /HPF
RBC MORPH BLD: NORMAL
REF LAB TEST METHOD: ABNORMAL
SCAN SLIDE: NORMAL
SMALL PLATELETS BLD QL SMEAR: ABNORMAL
SODIUM SERPL-SCNC: 137 MMOL/L (ref 136–145)
SP GR UR STRIP: 1.01 (ref 1–1.03)
SQUAMOUS #/AREA URNS HPF: ABNORMAL /HPF
UROBILINOGEN UR QL STRIP: ABNORMAL
VARIANT LYMPHS NFR BLD MANUAL: 18 % (ref 19.6–45.3)
WBC # UR STRIP: ABNORMAL /HPF
WBC NRBC COR # BLD AUTO: 7.56 10*3/MM3 (ref 3.4–10.8)

## 2024-06-15 PROCEDURE — 84145 PROCALCITONIN (PCT): CPT | Performed by: NURSE PRACTITIONER

## 2024-06-15 PROCEDURE — 87040 BLOOD CULTURE FOR BACTERIA: CPT | Performed by: NURSE PRACTITIONER

## 2024-06-15 PROCEDURE — 80053 COMPREHEN METABOLIC PANEL: CPT | Performed by: NURSE PRACTITIONER

## 2024-06-15 PROCEDURE — 85007 BL SMEAR W/DIFF WBC COUNT: CPT | Performed by: NURSE PRACTITIONER

## 2024-06-15 PROCEDURE — 25010000002 CEFTRIAXONE PER 250 MG: Performed by: STUDENT IN AN ORGANIZED HEALTH CARE EDUCATION/TRAINING PROGRAM

## 2024-06-15 PROCEDURE — 99283 EMERGENCY DEPT VISIT LOW MDM: CPT

## 2024-06-15 PROCEDURE — 83605 ASSAY OF LACTIC ACID: CPT | Performed by: NURSE PRACTITIONER

## 2024-06-15 PROCEDURE — 81001 URINALYSIS AUTO W/SCOPE: CPT | Performed by: NURSE PRACTITIONER

## 2024-06-15 PROCEDURE — 96365 THER/PROPH/DIAG IV INF INIT: CPT

## 2024-06-15 PROCEDURE — 36415 COLL VENOUS BLD VENIPUNCTURE: CPT

## 2024-06-15 PROCEDURE — 86140 C-REACTIVE PROTEIN: CPT | Performed by: NURSE PRACTITIONER

## 2024-06-15 PROCEDURE — 85025 COMPLETE CBC W/AUTO DIFF WBC: CPT | Performed by: NURSE PRACTITIONER

## 2024-06-15 PROCEDURE — 87086 URINE CULTURE/COLONY COUNT: CPT | Performed by: NURSE PRACTITIONER

## 2024-06-15 RX ORDER — POTASSIUM CHLORIDE 20 MEQ/1
40 TABLET, EXTENDED RELEASE ORAL ONCE
Status: COMPLETED | OUTPATIENT
Start: 2024-06-15 | End: 2024-06-15

## 2024-06-15 RX ORDER — CEFDINIR 300 MG/1
300 CAPSULE ORAL 2 TIMES DAILY
Qty: 24 CAPSULE | Refills: 0 | Status: SHIPPED | OUTPATIENT
Start: 2024-06-15 | End: 2024-06-27

## 2024-06-15 RX ORDER — SODIUM CHLORIDE 0.9 % (FLUSH) 0.9 %
10 SYRINGE (ML) INJECTION AS NEEDED
Status: DISCONTINUED | OUTPATIENT
Start: 2024-06-15 | End: 2024-06-15 | Stop reason: HOSPADM

## 2024-06-15 RX ADMIN — POTASSIUM CHLORIDE 40 MEQ: 1500 TABLET, EXTENDED RELEASE ORAL at 12:29

## 2024-06-15 RX ADMIN — SODIUM CHLORIDE 2000 MG: 9 INJECTION, SOLUTION INTRAVENOUS at 14:08

## 2024-06-15 NOTE — ED PROVIDER NOTES
Subjective   History of Present Illness  Patient is a 54-year-old female presents emergency today due to an abnormal blood culture.  Patient was told that she had E. coli in her bloodstream.  Patient reports he was called back yesterday.  Patient reports she is unable to come to the day.  Patient reports she feels some better that she had.  Patient reports she has been somewhat ill for few days.        Review of Systems   Constitutional: Negative.    HENT: Negative.     Eyes: Negative.    Respiratory: Negative.     Cardiovascular: Negative.    Gastrointestinal: Negative.    Endocrine: Negative.    Genitourinary: Negative.    Musculoskeletal: Negative.    Skin: Negative.    Allergic/Immunologic: Negative.    Neurological: Negative.    Hematological: Negative.    Psychiatric/Behavioral: Negative.         Past Medical History:   Diagnosis Date    Arthritis     Cancer     melanoma    COPD (chronic obstructive pulmonary disease)     History of transfusion     Hypertension     Hypothyroid     MRSA (methicillin resistant Staphylococcus aureus) carrier     2017    Sleep apnea        No Known Allergies    Past Surgical History:   Procedure Laterality Date    ANTERIOR VAGINAL REPAIR      BREAST CYST EXCISION Right 1992    benign    CHOLECYSTECTOMY N/A 4/15/2019    Procedure: CHOLECYSTECTOMY LAPAROSCOPIC with liver biopsy;  Surgeon: Srinivas Perez MD;  Location: [facility] OR;  Service: General    COLONOSCOPY N/A 4/15/2019    Procedure: COLONOSCOPY;  Surgeon: Srinivas Perez MD;  Location: [facility] OR;  Service: General    ESSURE TUBAL LIGATION      INCISION AND DRAINAGE ABSCESS      INCISION AND DRAINAGE ARM Left 8/15/2017    Procedure: INCISION AND DRAINAGE ABSCESS;  Surgeon: Ramakrishna Waldrop MD;  Location: [facility] OR;  Service:        Family History   Problem Relation Age of Onset    Hypertension Father     Cancer Father     Hypertension Brother     Cancer Brother     Breast cancer Neg Hx        Social History     Socioeconomic  History    Marital status:    Tobacco Use    Smoking status: Former     Current packs/day: 0.00     Average packs/day: 1 pack/day for 50.0 years (50.0 ttl pk-yrs)     Types: Cigarettes     Start date: 1968     Quit date: 2018     Years since quittin.5     Passive exposure: Current    Smokeless tobacco: Never   Vaping Use    Vaping status: Some Days    Substances: Nicotine, Flavoring    Devices: Refillable tank   Substance and Sexual Activity    Alcohol use: No    Drug use: No    Sexual activity: Defer           Objective   Physical Exam  Vitals and nursing note reviewed.   Constitutional:       Appearance: She is well-developed.   HENT:      Head: Normocephalic.      Right Ear: External ear normal.      Left Ear: External ear normal.   Eyes:      Conjunctiva/sclera: Conjunctivae normal.      Pupils: Pupils are equal, round, and reactive to light.   Cardiovascular:      Rate and Rhythm: Normal rate and regular rhythm.      Heart sounds: Normal heart sounds.   Pulmonary:      Effort: Pulmonary effort is normal.      Breath sounds: Normal breath sounds.   Abdominal:      General: Bowel sounds are normal.      Palpations: Abdomen is soft.   Musculoskeletal:         General: Normal range of motion.      Cervical back: Normal range of motion and neck supple.   Skin:     General: Skin is warm and dry.      Capillary Refill: Capillary refill takes less than 2 seconds.   Neurological:      Mental Status: She is alert and oriented to person, place, and time.   Psychiatric:         Behavior: Behavior normal.         Thought Content: Thought content normal.         Procedures           ED Course  ED Course as of 06/15/24 1436   Sat Ramin 15, 2024   1434 Discussed with Dr. Covington.  He advises to give patient 3 g Rocephin IV now and put patient on cefdinir twice daily for 12 more days. [SAMI]      ED Course User Index  [SAMI] Naif Mcgregor, SAUL                                             Medical Decision  Making  Amount and/or Complexity of Data Reviewed  Labs: ordered.    Risk  Prescription drug management.        Final diagnoses:   Acute UTI       ED Disposition  ED Disposition       ED Disposition   Discharge    Condition   Stable    Comment   --               Eva Castro APRN  [address] [place] [id number] [phone]    Schedule an appointment as soon as possible for a visit   For further evaluation         Where to Get Your Medications        You can get these medications from any pharmacy    Bring a paper prescription for each of these medications  cefdinir 300 MG capsule          Medication List      No changes were made to your prescriptions during this visit.

## 2024-06-16 LAB
BACTERIA SPEC AEROBE CULT: ABNORMAL
BACTERIA SPEC AEROBE CULT: ABNORMAL
BACTERIA SPEC AEROBE CULT: NO GROWTH
GRAM STN SPEC: ABNORMAL
GRAM STN SPEC: ABNORMAL
ISOLATED FROM: ABNORMAL
ISOLATED FROM: ABNORMAL

## 2024-06-20 LAB
BACTERIA SPEC AEROBE CULT: NORMAL
BACTERIA SPEC AEROBE CULT: NORMAL

## 2024-08-21 ENCOUNTER — OFFICE VISIT (OUTPATIENT)
Dept: PULMONOLOGY | Facility: CLINIC | Age: 64
End: 2024-08-21
Payer: COMMERCIAL

## 2024-08-21 VITALS
SYSTOLIC BLOOD PRESSURE: 124 MMHG | HEIGHT: 63 IN | WEIGHT: 129 LBS | DIASTOLIC BLOOD PRESSURE: 78 MMHG | BODY MASS INDEX: 22.86 KG/M2 | HEART RATE: 60 BPM | TEMPERATURE: 97.6 F | OXYGEN SATURATION: 96 %

## 2024-08-21 DIAGNOSIS — R07.9 CHEST PAIN, UNSPECIFIED TYPE: ICD-10-CM

## 2024-08-21 DIAGNOSIS — J44.9 CHRONIC OBSTRUCTIVE PULMONARY DISEASE, UNSPECIFIED COPD TYPE: Primary | ICD-10-CM

## 2024-08-21 DIAGNOSIS — F17.211 CIGARETTE NICOTINE DEPENDENCE IN REMISSION: ICD-10-CM

## 2024-08-21 PROCEDURE — 1159F MED LIST DOCD IN RCRD: CPT | Performed by: NURSE PRACTITIONER

## 2024-08-21 PROCEDURE — 99214 OFFICE O/P EST MOD 30 MIN: CPT | Performed by: NURSE PRACTITIONER

## 2024-08-21 PROCEDURE — 1160F RVW MEDS BY RX/DR IN RCRD: CPT | Performed by: NURSE PRACTITIONER

## 2024-08-21 RX ORDER — ALBUTEROL SULFATE 90 UG/1
2 AEROSOL, METERED RESPIRATORY (INHALATION) EVERY 4 HOURS PRN
Qty: 6.7 G | Refills: 5 | Status: SHIPPED | OUTPATIENT
Start: 2024-08-21

## 2024-08-21 RX ORDER — BUDESONIDE AND FORMOTEROL FUMARATE DIHYDRATE 160; 4.5 UG/1; UG/1
2 AEROSOL RESPIRATORY (INHALATION)
Qty: 10 G | Refills: 5 | Status: SHIPPED | OUTPATIENT
Start: 2024-08-21

## 2024-08-21 RX ORDER — CARIPRAZINE 3 MG/1
CAPSULE, GELATIN COATED ORAL
COMMUNITY
Start: 2024-08-01

## 2024-08-21 NOTE — PROGRESS NOTES
"Chief Complaint  Chronic obstructive pulmonary disease, unspecified COPD type    Subjective          Manuela Johnson presents to [facility] for   History of Present Illness    Ms. Johnson is a 64 year old female with a medical history significant for arthritis, COPD, hypertension, hypothyroidism, and sleep apnea.    She presents today for follow up on COPD.  She reports that she has been doing well.  She states that her breathing has been at baseline.  She is currently taking albuterol and neb treatments as needed.  She is also using Symbicort BID.  She states that she has been having some chest pain and has a history of a heart murmur.  She is requesting a cardiology referral.  She also tells me that she did not go for the Velpen Valve consultation.  She would maybe reconsider at some point.  She is a former smoker.        Objective   Vital Signs:   /78   Pulse 60   Temp 97.6 °F (36.4 °C)   Ht 160 cm (62.99\")   Wt 58.5 kg (129 lb)   SpO2 96%   BMI 22.86 kg/m²         Physical Exam    GENERAL APPEARANCE: Well developed, well nourished, alert and cooperative, and appears to be in no acute distress.    HEAD: normocephalic. Atraumatic.    EYES: PERRL, EOMI. Vision is grossly intact.    THROAT: Oral cavity and pharynx normal. No inflammation, swelling, exudate, or lesions.     NECK: Neck supple.  No thyromegaly.    CARDIAC: Normal S1 and S2. No S3, S4 or murmurs. Rhythm is regular.     RESPIRATORY:Bilateral air entry positive. No wheezing, crackles or rhonchi noted.    GI: Positive bowel sounds. Soft, nondistended, nontender.     MUSCULOSKELETAL: No significant deformity or joint abnormality. No edema. Peripheral pulses intact. No varicosities.    NEUROLOGICAL: Strength and sensation symmetric and intact throughout.     PSYCHIATRIC: The mental examination revealed the patient was oriented to person, place, and time.       Estimated body mass index is 22.86 " "kg/m² as calculated from the following:    Height as of this encounter: 160 cm (62.99\").    Weight as of this encounter: 58.5 kg (129 lb).        Result Review :   The following data was reviewed by: SAUL Amato on 08/21/2024:  Common labs          6/13/2024    11:10 6/13/2024    14:12 6/15/2024    11:42   Common Labs   Glucose 129  111  106    BUN 11  10  14    Creatinine 0.85  0.76  0.73    Sodium 134  137  137    Potassium 2.8  3.6  2.9    Chloride 93  101  94    Calcium 9.4  8.4  9.0    Albumin 3.2   3.1    Total Bilirubin 0.4   0.3    Alkaline Phosphatase 78   67    AST (SGOT) 11   15    ALT (SGPT) 9   11    WBC 11.81   7.56    Hemoglobin 11.8   11.2    Hematocrit 34.7   33.6    Platelets 346   461           PFT:3/17/22    Moderate obstructive lung disease with significant bronchodilator effect seen this occasion.  Lung volumes are significant air trapping.  Flow volume loop is obstructed.  If symptoms persist recommend repeating the study with diffusion capacity measurements.          Low dose lung cancer screening:  3/26/24      FINDINGS:    Lungs and pleural spaces:  Stable large calcified granuloma right  lung.  6.7 mm pleural-based nodule superior segment left lower lobe,  image #45, was previously 6.6 mm.  Mild centrilobular emphysema is  noted.  No consolidation.  No pneumothorax.  No significant effusion.    Heart:  Mild coronary artery calcifications.    Bones/joints:  Stable degenerative changes thoracic spine. No acute  bony findings identified.  No dislocation.    Soft tissues:  Unremarkable as visualized.    Vasculature:  See above.    Lymph nodes:  Unremarkable as visualized.  No enlarged lymph nodes.    Gallbladder and bile ducts:  Cholecystectomy.     IMPRESSION:  1.  Stable exam with no change in pleural-based nodule left lower lobe  and calcified granuloma right lung.  2.  Other nonacute findings as above appear stable.        ASSESSMENT:  Lung-RADS score: 2 - Benign Appearance " "or Behavior.   Recommend continued annual screening with a low-dose CT (LDCT) in 12  months.     This report was finalized on 3/26/2024 11:40 AM by Dr. Gabe Marroquin MD.      Previous chest imaging:none    Alpha-1 antitrypsin screening: not completed/    STOP-Bang Score:  NA   Sprakers Sleepiness Scale: NA        ABG:    pH No results found for: \"PHART\"   pO2 No results found for: \"PO2ART\"   pCO2 No results found for: \"CCY3FMC\"   HCO3 No results found for: \"ALB2QWM\"                      Assessment and Plan    Problem List Items Addressed This Visit    None  Visit Diagnoses       Chronic obstructive pulmonary disease, unspecified COPD type    -  Primary    Relevant Medications    albuterol sulfate  (90 Base) MCG/ACT inhaler    Symbicort 160-4.5 MCG/ACT inhaler    Other Relevant Orders    Complete PFT - Pre & Post Bronchodilator    Chest pain, unspecified type        Relevant Orders    Ambulatory Referral to Cardiology    Cigarette nicotine dependence in remission                Manuela Johnson  reports that she quit smoking about 5 years ago. Her smoking use included cigarettes. She started smoking about 55 years ago. She has a 50 pack-year smoking history. She has been exposed to tobacco smoke. She has never used smokeless tobacco.     Continue albuterol inhaler and duonebs as needed.  Continue Symbicort BID.  Sent refills.     Low dose CT Chest for lung cancer screening was reviewed.  Pleural based nodule is noted to be stable.    Ordered PFT to assess lung function.  Will also determine if she is a candidate for a Papillion valve.    Placed a referral for cardiology.    She has been having chest pain and has a history of a heart murmur.          Follow Up   Return in about 6 months (around 2/21/2025).  Patient was given instructions and counseling regarding her condition or for health maintenance advice. Please see specific information pulled into the AVS if appropriate.        "

## 2024-09-26 ENCOUNTER — OFFICE VISIT (OUTPATIENT)
Dept: CARDIOLOGY | Facility: CLINIC | Age: 64
End: 2024-09-26
Payer: COMMERCIAL

## 2024-09-26 VITALS
HEART RATE: 77 BPM | WEIGHT: 125 LBS | SYSTOLIC BLOOD PRESSURE: 111 MMHG | BODY MASS INDEX: 22.15 KG/M2 | HEIGHT: 63 IN | OXYGEN SATURATION: 96 % | DIASTOLIC BLOOD PRESSURE: 67 MMHG

## 2024-09-26 DIAGNOSIS — I10 PRIMARY HYPERTENSION: ICD-10-CM

## 2024-09-26 DIAGNOSIS — E78.5 HYPERLIPIDEMIA LDL GOAL <100: ICD-10-CM

## 2024-09-26 DIAGNOSIS — R07.89 OTHER CHEST PAIN: Primary | ICD-10-CM

## 2024-09-26 RX ORDER — ROSUVASTATIN CALCIUM 20 MG/1
20 TABLET, COATED ORAL DAILY
Qty: 30 TABLET | Refills: 11 | Status: SHIPPED | OUTPATIENT
Start: 2024-09-26 | End: 2024-09-30

## 2024-09-30 ENCOUNTER — TELEPHONE (OUTPATIENT)
Dept: CARDIOLOGY | Facility: CLINIC | Age: 64
End: 2024-09-30
Payer: COMMERCIAL

## 2024-09-30 ENCOUNTER — HOSPITAL ENCOUNTER (OUTPATIENT)
Dept: RESPIRATORY THERAPY | Facility: HOSPITAL | Age: 64
Discharge: HOME | End: 2024-09-30
Admitting: NURSE PRACTITIONER
Payer: COMMERCIAL

## 2024-09-30 VITALS — OXYGEN SATURATION: 97 % | HEART RATE: 71 BPM | RESPIRATION RATE: 16 BRPM

## 2024-09-30 DIAGNOSIS — J44.9 CHRONIC OBSTRUCTIVE PULMONARY DISEASE, UNSPECIFIED COPD TYPE: ICD-10-CM

## 2024-09-30 PROCEDURE — 94726 PLETHYSMOGRAPHY LUNG VOLUMES: CPT

## 2024-09-30 PROCEDURE — 94640 AIRWAY INHALATION TREATMENT: CPT

## 2024-09-30 PROCEDURE — 94760 N-INVAS EAR/PLS OXIMETRY 1: CPT

## 2024-09-30 PROCEDURE — 94060 EVALUATION OF WHEEZING: CPT

## 2024-09-30 PROCEDURE — 94799 UNLISTED PULMONARY SVC/PX: CPT

## 2024-09-30 PROCEDURE — 94729 DIFFUSING CAPACITY: CPT

## 2024-09-30 RX ORDER — ROSUVASTATIN CALCIUM 20 MG/1
20 TABLET, COATED ORAL DAILY
Qty: 30 TABLET | Refills: 11 | Status: SHIPPED | OUTPATIENT
Start: 2024-09-30 | End: 2024-10-02 | Stop reason: SDUPTHER

## 2024-09-30 RX ORDER — ALBUTEROL SULFATE 0.83 MG/ML
2.5 SOLUTION RESPIRATORY (INHALATION) ONCE
Status: COMPLETED | OUTPATIENT
Start: 2024-09-30 | End: 2024-09-30

## 2024-09-30 RX ADMIN — ALBUTEROL SULFATE 2.5 MG: 2.5 SOLUTION RESPIRATORY (INHALATION) at 10:58

## 2024-09-30 NOTE — TELEPHONE ENCOUNTER
Patient went to get her cholesterol medication and the pharmacy would not fill it under Dr Hannon name she is not covered for medications with medicaid.

## 2024-10-01 ENCOUNTER — TELEPHONE (OUTPATIENT)
Dept: CARDIOLOGY | Facility: CLINIC | Age: 64
End: 2024-10-01

## 2024-10-01 PROCEDURE — 94060 EVALUATION OF WHEEZING: CPT | Performed by: INTERNAL MEDICINE

## 2024-10-01 PROCEDURE — 94726 PLETHYSMOGRAPHY LUNG VOLUMES: CPT | Performed by: INTERNAL MEDICINE

## 2024-10-01 PROCEDURE — 94729 DIFFUSING CAPACITY: CPT | Performed by: INTERNAL MEDICINE

## 2024-10-01 NOTE — TELEPHONE ENCOUNTER
Caller: Manuela Johnson    Relationship: Self    Best call back number: [phone]     What is the best time to reach you: ANYTIME    Who are you requesting to speak with (clinical staff, provider,  specific staff member): PROVIDER      What was the call regarding: PT WAS PRESCRIBED ROSUVASTATIN 20 MG TAB ON 9.26.24 BY DR. VERA. MEDICAID WILL NOT COVER THIS MEDICATION  UNDER DR. VERA AS THE PRECRIBING PROVIDER BC SHE HAS NOT BEEN WITH OUR OFFICE FOR 30 DAYS. CAN SAUL CUNHA PLEASE CALL THIS IN FOR PT, WHERE SHE IS ON DR. VERA'S TEAM? PLEASE ADVISE. THANK YOU SO MUCH!    Is it okay if the provider responds through [employer]: NO

## 2024-10-02 RX ORDER — ROSUVASTATIN CALCIUM 20 MG/1
20 TABLET, COATED ORAL DAILY
Qty: 30 TABLET | Refills: 1 | Status: SHIPPED | OUTPATIENT
Start: 2024-10-02 | End: 2024-10-02 | Stop reason: SDUPTHER

## 2024-10-02 RX ORDER — ROSUVASTATIN CALCIUM 20 MG/1
20 TABLET, COATED ORAL DAILY
Qty: 90 TABLET | Refills: 0 | Status: SHIPPED | OUTPATIENT
Start: 2024-10-02

## 2024-10-04 ENCOUNTER — HOSPITAL ENCOUNTER (OUTPATIENT)
Dept: CARDIOLOGY | Facility: HOSPITAL | Age: 64
Discharge: HOME | End: 2024-10-04
Payer: COMMERCIAL

## 2024-10-04 DIAGNOSIS — R07.89 OTHER CHEST PAIN: ICD-10-CM

## 2024-10-04 LAB
BH CV ECHO MEAS - ACS: 1.6 CM
BH CV ECHO MEAS - AI P1/2T: 722.9 MSEC
BH CV ECHO MEAS - AO MAX PG: 5.5 MMHG
BH CV ECHO MEAS - AO MEAN PG: 3 MMHG
BH CV ECHO MEAS - AO ROOT DIAM: 2.6 CM
BH CV ECHO MEAS - AO V2 MAX: 117 CM/SEC
BH CV ECHO MEAS - AO V2 VTI: 24.7 CM
BH CV ECHO MEAS - AVA(I,D): 1.58 CM2
BH CV ECHO MEAS - EDV(CUBED): 51.5 ML
BH CV ECHO MEAS - EDV(MOD-SP2): 46.5 ML
BH CV ECHO MEAS - EDV(MOD-SP4): 35.7 ML
BH CV ECHO MEAS - EF(MOD-BP): 64.9 %
BH CV ECHO MEAS - EF(MOD-SP2): 63.4 %
BH CV ECHO MEAS - EF(MOD-SP4): 62.7 %
BH CV ECHO MEAS - ESV(CUBED): 19.5 ML
BH CV ECHO MEAS - ESV(MOD-SP2): 17 ML
BH CV ECHO MEAS - ESV(MOD-SP4): 13.3 ML
BH CV ECHO MEAS - FS: 27.7 %
BH CV ECHO MEAS - IVS/LVPW: 1.09 CM
BH CV ECHO MEAS - IVSD: 0.97 CM
BH CV ECHO MEAS - LA DIMENSION: 2.8 CM
BH CV ECHO MEAS - LAT PEAK E' VEL: 6.3 CM/SEC
BH CV ECHO MEAS - LV MASS(C)D: 102.2 GRAMS
BH CV ECHO MEAS - LV MAX PG: 2.5 MMHG
BH CV ECHO MEAS - LV MEAN PG: 1 MMHG
BH CV ECHO MEAS - LV V1 MAX: 79 CM/SEC
BH CV ECHO MEAS - LV V1 VTI: 15.3 CM
BH CV ECHO MEAS - LVIDD: 3.7 CM
BH CV ECHO MEAS - LVIDS: 2.7 CM
BH CV ECHO MEAS - LVOT AREA: 2.5 CM2
BH CV ECHO MEAS - LVOT DIAM: 1.8 CM
BH CV ECHO MEAS - LVPWD: 0.89 CM
BH CV ECHO MEAS - MED PEAK E' VEL: 6 CM/SEC
BH CV ECHO MEAS - MV A MAX VEL: 82.3 CM/SEC
BH CV ECHO MEAS - MV DEC SLOPE: 238 CM/SEC2
BH CV ECHO MEAS - MV DEC TIME: 0.25 SEC
BH CV ECHO MEAS - MV E MAX VEL: 60.4 CM/SEC
BH CV ECHO MEAS - MV E/A: 0.73
BH CV ECHO MEAS - PA ACC TIME: 0.15 SEC
BH CV ECHO MEAS - RAP SYSTOLE: 5 MMHG
BH CV ECHO MEAS - RVSP: 16 MMHG
BH CV ECHO MEAS - SV(LVOT): 38.9 ML
BH CV ECHO MEAS - SV(MOD-SP2): 29.5 ML
BH CV ECHO MEAS - SV(MOD-SP4): 22.4 ML
BH CV ECHO MEAS - TAPSE (>1.6): 2.27 CM
BH CV ECHO MEAS - TR MAX PG: 10.6 MMHG
BH CV ECHO MEAS - TR MAX VEL: 163 CM/SEC
BH CV ECHO MEASUREMENTS AVERAGE E/E' RATIO: 9.82
LEFT ATRIUM VOLUME INDEX: 14.1 ML/M2

## 2024-10-04 PROCEDURE — 93306 TTE W/DOPPLER COMPLETE: CPT

## 2024-10-22 ENCOUNTER — TELEPHONE (OUTPATIENT)
Dept: PULMONOLOGY | Facility: CLINIC | Age: 64
End: 2024-10-22
Payer: COMMERCIAL

## 2024-10-22 ENCOUNTER — DOCUMENTATION (OUTPATIENT)
Dept: PULMONOLOGY | Facility: CLINIC | Age: 64
End: 2024-10-22
Payer: COMMERCIAL

## 2024-10-22 NOTE — PROGRESS NOTES
Patient called back about test results.  PFT showed a moderate obstruction with no significant bronchodilator response. We will continue current inhalers.

## 2024-11-04 ENCOUNTER — OFFICE VISIT (OUTPATIENT)
Dept: CARDIOLOGY | Facility: CLINIC | Age: 64
End: 2024-11-04
Payer: COMMERCIAL

## 2024-11-04 VITALS
SYSTOLIC BLOOD PRESSURE: 96 MMHG | DIASTOLIC BLOOD PRESSURE: 60 MMHG | BODY MASS INDEX: 22.25 KG/M2 | WEIGHT: 125.6 LBS | HEART RATE: 58 BPM | OXYGEN SATURATION: 96 % | HEIGHT: 63 IN

## 2024-11-04 DIAGNOSIS — R07.89 OTHER CHEST PAIN: Primary | ICD-10-CM

## 2024-11-04 DIAGNOSIS — I10 PRIMARY HYPERTENSION: ICD-10-CM

## 2024-11-04 DIAGNOSIS — E78.5 HYPERLIPIDEMIA LDL GOAL <100: ICD-10-CM

## 2024-11-04 PROCEDURE — 1159F MED LIST DOCD IN RCRD: CPT | Performed by: INTERNAL MEDICINE

## 2024-11-04 PROCEDURE — 99213 OFFICE O/P EST LOW 20 MIN: CPT | Performed by: INTERNAL MEDICINE

## 2024-11-04 PROCEDURE — 1160F RVW MEDS BY RX/DR IN RCRD: CPT | Performed by: INTERNAL MEDICINE

## 2024-11-04 NOTE — PROGRESS NOTES
"      Eva Castro APRN  No ref. provider found    Manuela Johnson  1960 09/26/2024    Subjective     Manuela Johnson is a 64 y.o. female who presents today to [facility] for Follow-up.        Ms. Johnson is a pleasant 64 years old  female with past medical history significant for hypertension-controlled, hyperlipidemia, hypothyroidism, sleep apnea, COPD, former smoker with 50 pack years history [quit 5 years plaque], prediabetes mellitus [6/2024 HbA1c 6.2] who presents today for 5 weeks follow-up.    Ms. Johnson was seen 5 weeks back for  episodes of sharp left-sided chest pain.  Episodes occurs about 6-7 times a month with no specific inciting, aggravating or relieving factor.  She denies any radiation.  No associated symptoms like shortness of breath, palpitations, nausea, diaphoresis, dizziness.  She describes pain as sharp lasting for a second.  She describes 1 instance when she was sitting down and watching TV, she felt a sharp pain that lasted for a second and went away on its own with no recurrence.  Ms. Johnson is pretty active and takes care of her 4 grandchildren and who are from 8 to 21 years of age.  She does her household chores without any discomfort.  She has 13 steps stairs at home which she takes  multiple times during the day without any discomfort.  She also regularly works in her yard.  She used to use a push mower but has not lately.  She has some baseline mild shortness of breath from her underlying emphysema but is not limiting her daily activities.  She denies any orthopnea, PND, leg swelling, palpitations, lightheadedness, syncope.  She denies alcohol intake, no recreational drug use.  History of 50-pack-year smoking, quit 5 years back.  She tells me she was told she had a \" whisper murmur\" by her provider at the age of 20.  Drinks 1 cup of caffeine a day.      11/4/2024.    Patient today states that her chest pain has become less frequent.  She " continues to stay active with no exertional chest pain, shortness of breath, lightheadedness, dizziness.  Transthoracic echo was done after last visit on 10//2024 that showed normal LVEF 61 to 65% with no wall motion abnormality.  Patient had mild MR with no other significant valvular abnormalities.  She denies no other active complaints.    Cardiac risk factors:hypercholesterolemia and hypertension, predM, former smoker with 50 pack years history    No Known Allergies:    Current Outpatient Medications:     albuterol sulfate  (90 Base) MCG/ACT inhaler, Inhale 2 puffs Every 4 (Four) Hours As Needed for Wheezing., Disp: 6.7 g, Rfl: 5    carbidopa-levodopa ER (SINEMET CR)  MG per tablet, Take 2 tablets by mouth Every Night., Disp: , Rfl:     celecoxib (CeleBREX) 100 MG capsule, , Disp: , Rfl:     cetirizine (zyrTEC) 10 MG tablet, Take 1 tablet by mouth Daily., Disp: , Rfl:     citalopram (CeleXA) 20 MG tablet, Take 1 tablet by mouth Every Evening., Disp: , Rfl:     cyclobenzaprine (FLEXERIL) 10 MG tablet, Take 1 tablet by mouth 3 (Three) Times a Day., Disp: , Rfl:     ipratropium-albuterol (DUO-NEB) 0.5-2.5 mg/3 ml nebulizer, Inhale 4 (Four) Times a Day., Disp: , Rfl:     levothyroxine (SYNTHROID, LEVOTHROID) 88 MCG tablet, , Disp: , Rfl:     losartan-hydrochlorothiazide (HYZAAR) 100-25 MG per tablet, Take 1 tablet by mouth Every Night., Disp: , Rfl:     metoprolol succinate XL (TOPROL-XL) 25 MG 24 hr tablet, Take 1 tablet by mouth Daily., Disp: , Rfl:     oxybutynin XL (DITROPAN-XL) 5 MG 24 hr tablet, Take 1 tablet by mouth Daily., Disp: , Rfl:     pantoprazole (PROTONIX) 40 MG EC tablet, Take 1 tablet by mouth Daily., Disp: , Rfl:     rosuvastatin (CRESTOR) 20 MG tablet, Take 1 tablet by mouth Daily., Disp: 90 tablet, Rfl: 0    spironolactone (ALDACTONE) 25 MG tablet, Take 1 tablet by mouth Daily., Disp: , Rfl:     Symbicort 160-4.5 MCG/ACT inhaler, Inhale 2 puffs 2 (Two) Times a Day., Disp: 10 g, Rfl:  5    gabapentin (NEURONTIN) 300 MG capsule, , Disp: , Rfl:     guaiFENesin-codeine (ROMILAR-AC) 100-10 MG/5ML solution/syrup, , Disp: , Rfl:     ibuprofen (ADVIL,MOTRIN) 600 MG tablet, Take 600 tablets by mouth 4 (Four) Times a Day As Needed for Mild Pain. (Patient not taking: Reported on 9/26/2024), Disp: , Rfl:     mirtazapine (REMERON) 15 MG tablet, Take 1-2 tablets by mouth At Night As Needed. (Patient not taking: Reported on 11/4/2024), Disp: , Rfl:     temazepam (RESTORIL) 15 MG capsule, Take 1 capsule by mouth At Night As Needed. (Patient not taking: Reported on 11/4/2024), Disp: , Rfl:     vitamin D (ERGOCALCIFEROL) 1.25 MG (10898 UT) capsule capsule, Take 1 capsule by mouth 1 (One) Time Per Week. (Patient not taking: Reported on 11/4/2024), Disp: , Rfl:     Vraylar 3 MG capsule capsule, , Disp: , Rfl:     Past Medical History:   Diagnosis Date    Arthritis     Cancer     melanoma    COPD (chronic obstructive pulmonary disease)     History of transfusion     Hypertension     Hypothyroid     MRSA (methicillin resistant Staphylococcus aureus) carrier     2017    Sleep apnea      Past Surgical History:   Procedure Laterality Date    ANTERIOR VAGINAL REPAIR      BREAST CYST EXCISION Right 1992    benign    CHOLECYSTECTOMY N/A 4/15/2019    Procedure: CHOLECYSTECTOMY LAPAROSCOPIC with liver biopsy;  Surgeon: Srinivas Perez MD;  Location: [facility] OR;  Service: General    COLONOSCOPY N/A 4/15/2019    Procedure: COLONOSCOPY;  Surgeon: Srinivas Perez MD;  Location: [facility] OR;  Service: General    ESSURE TUBAL LIGATION      INCISION AND DRAINAGE ABSCESS      INCISION AND DRAINAGE ARM Left 8/15/2017    Procedure: INCISION AND DRAINAGE ABSCESS;  Surgeon: Ramakrishna Waldrop MD;  Location: [facility] OR;  Service:      Family History   Problem Relation Age of Onset    Hypertension Father     Cancer Father     Hypertension Brother     Cancer Brother     Breast cancer Neg Hx      Social History     Tobacco Use    Smoking status:  "Former     Current packs/day: 0.00     Average packs/day: 1 pack/day for 50.0 years (50.0 ttl pk-yrs)     Types: Cigarettes     Start date: 1968     Quit date: 2018     Years since quittin.9     Passive exposure: Current    Smokeless tobacco: Never   Vaping Use    Vaping status: Former    Substances: Nicotine, Flavoring    Devices: Refillable tank   Substance Use Topics    Alcohol use: No    Drug use: No       Objective   Blood pressure 96/60, pulse 58, height 160 cm (62.99\"), weight 57 kg (125 lb 9.6 oz), SpO2 96%.  Body mass index is 22.26 kg/m².    Constitutional:       Appearance: Healthy appearance. Not in distress.   Eyes:      Conjunctiva/sclera: Conjunctivae normal.   Neck:      Vascular: JVD normal.   Pulmonary:      Comments: Normal effort.  Bilateral decreased air sounds in the basal lung field.  No added sounds.  Cardiovascular:      PMI at left midclavicular line. Normal rate. Regular rhythm. Normal S1. Normal S2.       Murmurs: There is no murmur.      No gallop.  No click. No rub.   Pulses:     Intact distal pulses.   Edema:     Peripheral edema absent.   Abdominal:      Palpations: Abdomen is soft.      Tenderness: There is no abdominal tenderness.   Skin:     General: Skin is warm and dry.   Neurological:      Mental Status: Alert and oriented to person, place and time.           DATA:   Results for orders placed during the hospital encounter of 10/04/24    Adult Transthoracic Echo Complete W/ Cont if Necessary Per Protocol    Interpretation Summary    Left ventricular systolic function is normal. Calculated left ventricular EF = 64.9% Left ventricular ejection fraction appears to be 61 - 65%.    Mild aortic valve regurgitation is present.    Estimated right ventricular systolic pressure from tricuspid regurgitation is normal (<35 mmHg).      Results for orders placed during the hospital encounter of 19    US Liver    Narrative  EXAMINATION: US LIVER-      CLINICAL INDICATION:    " " K76; K76.0-Fatty (change of) liver, not  elsewhere classified    TECHNIQUE: Multiplanar gray scale ultrasound of the abdomen.    COMPARISON: NONE    FINDINGS:  Visualized pancreas is unremarkable.    The CBD measures 2.4 mm .  The liver demonstrates normal echogenicity without focal lesion.  No ascites demonstrated.    Impression  Grossly unremarkable appearance of the right upper quadrant.    This report was finalized on 11/20/2019 10:01 AM by Dr. Edvin Marin MD.      No results found for: \"BNP\"  No results found for: \"PSA\"   Lab Results   Component Value Date    MG 2.1 06/13/2024     No results found for: \"INR\"  No results found for: \"CKTOTAL\"  Lab Results   Component Value Date    CHOL 147 07/21/2023    CHLPL 187 01/23/2015    CHLPL 161 09/26/2014    CHLPL 161 01/16/2014     Lab Results   Component Value Date    TRIG 81 07/21/2023    TRIG 62 01/23/2015    TRIG 79 09/26/2014     Lab Results   Component Value Date    HDL 39 (L) 07/21/2023    HDL 55 (L) 01/23/2015    HDL 56 (L) 09/26/2014     Lab Results   Component Value Date    LDL 92 07/21/2023     (H) 01/23/2015    LDL 89 09/26/2014     No components found for: \"A1C\"      Lab Results   Component Value Date    TSH 1.980 06/13/2024             Invalid input(s): \"LABALBU\", \"PROT\"        Results review: During today's encounter, all relevant clinical data has been reviewed.        Assessment & Plan    Diagnosis Plan   1. Other chest pain        2. Primary hypertension        3. Hyperlipidemia LDL goal <100              Recommendations  No orders of the defined types were placed in this encounter.        Chest pain , likely noncardiac.  Patient leads a pretty active lifestyle and is more than 4 METS based on her daily activity level of gardening, climbing 13 steps multiple times a day, doing daily household chores, taking care of her four grandchildren without any discomfort.  Reviewed baseline echo today that showed normal LVEF with no wall motion " abnormality.  Mild MR.  ASCVD risk score 5% [5% risk of cardiovascular event-coronary or stroke death or nonfatal MI or stroke, in next 10 years] and 3.6% 10-year cardiovascular risk if risk factors were optimal.      Patient was started on osuvastatin 20 Mg daily last month.  Tolerating the medicine well.  4.  Blood pressure under control.  Continue current antihypertensives.  The need for a routine of aerobic exercise for at least 30 mins on five days of the week was reviewed with the patient. The importance of limiting sodium intake to 2 grams per day was stressed, as well as increasing the intake of potassium, foods with high fiber content, fresh fruit and low fat dairy products.     5. A diet low in refined carbohydrates and high in vegetable-sourced fiber with avoidance of excessive animal products was recommended, stressing the importance of substituting saturated fats with mono-unsaturated fats, such as olive oil, essential fatty acids and oily types of fish.     Patient is stable from cardiac standpoint.    New Medications:   No orders of the defined types were placed in this encounter.      Discontinued Medications:   There are no discontinued medications.     Return in about 1 year (around 11/4/2025).      Thank you for allowing me to participate in the care of Manuela Johnson. Feel free to contact me directly with any further questions or concerns.      This document has been electronically signed by Mireya Yang MD   November 4, 2024 13:26 EST    Dictated Utilizing Dragon Dictation: Part of this note may be an electronic transcription/translation of spoken language to printed text using the Dragon Dictation System.

## 2025-03-26 ENCOUNTER — OFFICE VISIT (OUTPATIENT)
Dept: PULMONOLOGY | Facility: CLINIC | Age: 65
End: 2025-03-26
Payer: MEDICARE

## 2025-03-26 VITALS
HEART RATE: 76 BPM | BODY MASS INDEX: 23.85 KG/M2 | TEMPERATURE: 97.7 F | OXYGEN SATURATION: 98 % | HEIGHT: 62 IN | SYSTOLIC BLOOD PRESSURE: 138 MMHG | DIASTOLIC BLOOD PRESSURE: 68 MMHG | WEIGHT: 129.6 LBS

## 2025-03-26 DIAGNOSIS — F17.211 CIGARETTE NICOTINE DEPENDENCE IN REMISSION: ICD-10-CM

## 2025-03-26 DIAGNOSIS — Z12.2 ENCOUNTER FOR SCREENING FOR LUNG CANCER: ICD-10-CM

## 2025-03-26 DIAGNOSIS — J44.9 CHRONIC OBSTRUCTIVE PULMONARY DISEASE, UNSPECIFIED COPD TYPE: Primary | ICD-10-CM

## 2025-03-26 RX ORDER — FLUTICASONE FUROATE, UMECLIDINIUM BROMIDE AND VILANTEROL TRIFENATATE 100; 62.5; 25 UG/1; UG/1; UG/1
1 POWDER RESPIRATORY (INHALATION)
Qty: 60 EACH | Refills: 5 | Status: SHIPPED | OUTPATIENT
Start: 2025-03-26

## 2025-03-26 RX ORDER — ALBUTEROL SULFATE 90 UG/1
2 INHALANT RESPIRATORY (INHALATION) EVERY 4 HOURS PRN
Qty: 6.7 G | Refills: 5 | Status: SHIPPED | OUTPATIENT
Start: 2025-03-26

## 2025-03-26 RX ORDER — MELOXICAM 15 MG/1
TABLET ORAL
COMMUNITY
Start: 2025-03-20

## 2025-03-26 RX ORDER — PREDNISONE 20 MG/1
40 TABLET ORAL DAILY
Qty: 10 TABLET | Refills: 0 | Status: SHIPPED | OUTPATIENT
Start: 2025-03-26

## 2025-03-26 RX ORDER — HYDROXYZINE PAMOATE 50 MG/1
50 CAPSULE ORAL
COMMUNITY
Start: 2024-12-10

## 2025-03-26 RX ORDER — CITALOPRAM HYDROBROMIDE 40 MG/1
1 TABLET ORAL DAILY
COMMUNITY
Start: 2025-03-09

## 2025-03-26 RX ORDER — AZITHROMYCIN 250 MG/1
TABLET, FILM COATED ORAL
Qty: 6 TABLET | Refills: 0 | Status: SHIPPED | OUTPATIENT
Start: 2025-03-26

## 2025-03-26 NOTE — PROGRESS NOTES
"Chief Complaint  COPD    Subjective          Manuela Johnson presents to [facility] for   History of Present Illness    Ms. Johnson is a 65-year-old female with a medical history significant for iritis, COPD, hypertension, hypothyroidism and sleep apnea.    She presents today for follow-up on COPD.  She reports that she has been doing well since her last visit and that for the most part her breathing has been at baseline.  She states that for the last month she has had increased head congestion, chest congestion and cough.  She is currently taking Trelegy once daily as well as albuterol as needed.  She is a former smoker.      Objective   Vital Signs:   /68   Pulse 76   Temp 97.7 °F (36.5 °C)   Ht 157.5 cm (62\")   Wt 58.8 kg (129 lb 9.6 oz)   SpO2 98%   BMI 23.70 kg/m²         Physical Exam    GENERAL APPEARANCE: Well developed, well nourished, alert and cooperative, and appears to be in no acute distress.    HEAD: normocephalic. Atraumatic.    EYES: PERRL, EOMI. Vision is grossly intact.    THROAT: Oral cavity and pharynx normal. No inflammation, swelling, exudate, or lesions.     NECK: Neck supple.  No thyromegaly.    CARDIAC: Normal S1 and S2. No S3, S4 or murmurs. Rhythm is regular.     RESPIRATORY:Bilateral air entry positive. No wheezing, crackles or rhonchi noted.    GI: Positive bowel sounds. Soft, nondistended, nontender.     MUSCULOSKELETAL: No significant deformity or joint abnormality. No edema. Peripheral pulses intact. No varicosities.    NEUROLOGICAL: Strength and sensation symmetric and intact throughout.     PSYCHIATRIC: The mental examination revealed the patient was oriented to person, place, and time.       Estimated body mass index is 23.7 kg/m² as calculated from the following:    Height as of this encounter: 157.5 cm (62\").    Weight as of this encounter: 58.8 kg (129 lb 9.6 oz).        Result Review :   The following data was " reviewed by: SAUL Amato on 03/26/2025:  Common labs          6/13/2024    11:10 6/13/2024    14:12 6/15/2024    11:42   Common Labs   Glucose 129  111  106    BUN 11  10  14    Creatinine 0.85  0.76  0.73    Sodium 134  137  137    Potassium 2.8  3.6  2.9    Chloride 93  101  94    Calcium 9.4  8.4  9.0    Albumin 3.2   3.1    Total Bilirubin 0.4   0.3    Alkaline Phosphatase 78   67    AST (SGOT) 11   15    ALT (SGPT) 9   11    WBC 11.81   7.56    Hemoglobin 11.8   11.2    Hematocrit 34.7   33.6    Platelets 346   461           PFT:9/30/24    Moderate obstructive lung disease with no significant bronchodilator effect seen on this occasion.  Diffusion capacity is moderately reduced.  Lung volumes are normal.  Flow volume loop is obstructed.          Low dose lung cancer screening:3/26/24    FINDINGS:    Lungs and pleural spaces:  Stable large calcified granuloma right  lung.  6.7 mm pleural-based nodule superior segment left lower lobe,  image #[id number], was previously 6.6 mm.  Mild centrilobular emphysema is  noted.  No consolidation.  No pneumothorax.  No significant effusion.    Heart:  Mild coronary artery calcifications.    Bones/joints:  Stable degenerative changes thoracic spine. No acute  bony findings identified.  No dislocation.    Soft tissues:  Unremarkable as visualized.    Vasculature:  See above.    Lymph nodes:  Unremarkable as visualized.  No enlarged lymph nodes.    Gallbladder and bile ducts:  Cholecystectomy.     IMPRESSION:  1.  Stable exam with no change in pleural-based nodule left lower lobe  and calcified granuloma right lung.  2.  Other nonacute findings as above appear stable.        ASSESSMENT:  Lung-RADS score: 2 - Benign Appearance or Behavior.   Recommend continued annual screening with a low-dose CT (LDCT) in 12  months.     This report was finalized on 3/26/2024 11:40 AM by Dr. Gabe Marroquin MD.    Previous chest imaging:NA    Alpha-1 antitrypsin  "screening:MM    STOP-Bang Score:   NA  [place] Sleepiness Scale:   NA      ABG:    pH No results found for: \"PHART\"   pO2 No results found for: \"PO2ART\"   pCO2 No results found for: \"PCW9BON\"   HCO3 No results found for: \"KZY3XRR\"                      Assessment and Plan    Problem List Items Addressed This Visit    None  Visit Diagnoses         Chronic obstructive pulmonary disease, unspecified COPD type    -  Primary    Relevant Medications    albuterol sulfate  (90 Base) MCG/ACT inhaler    Fluticasone-Umeclidin-Vilant (Trelegy Ellipta) 100-62.5-25 MCG/ACT inhaler    predniSONE (DELTASONE) 20 MG tablet      Cigarette nicotine dependence in remission        Relevant Medications    citalopram (CeleXA) 40 MG tablet    hydrOXYzine pamoate (VISTARIL) 50 MG capsule    Other Relevant Orders    CT chest low dose wo      Encounter for screening for lung cancer        Relevant Orders    CT chest low dose wo          Manuela Johnson  reports that she quit smoking about 6 years ago. Her smoking use included cigarettes. She started smoking about 56 years ago. She has a 50 pack-year smoking history. She has been exposed to tobacco smoke. She has never used smokeless tobacco.    Continue albuterol inhaler as needed.  Continue nebs as needed.  Continue Trelegy once daily.  Sent in refills.    Ordered low-dose CT chest for lung cancer screening.    PFT was discussed with her in detail.  PFT is consistent with moderate COPD.  She had inquired about a [place] valve at her last visit but given her PFT results she has not a candidate for this at this time.    Sent and antibiotic and steroid in case of exacerbation.    Follow Up   Return in about 6 months (around 9/26/2025).  Patient was given instructions and counseling regarding her condition or for health maintenance advice. Please see specific information pulled into the AVS if appropriate.        "

## 2025-05-08 ENCOUNTER — HOSPITAL ENCOUNTER (OUTPATIENT)
Dept: CT IMAGING | Facility: HOSPITAL | Age: 65
Discharge: HOME | End: 2025-05-08
Admitting: NURSE PRACTITIONER
Payer: MEDICARE

## 2025-05-08 DIAGNOSIS — Z12.2 ENCOUNTER FOR SCREENING FOR LUNG CANCER: ICD-10-CM

## 2025-05-08 DIAGNOSIS — F17.211 CIGARETTE NICOTINE DEPENDENCE IN REMISSION: ICD-10-CM

## 2025-05-08 PROCEDURE — 71271 CT THORAX LUNG CANCER SCR C-: CPT

## 2025-05-09 ENCOUNTER — DOCUMENTATION (OUTPATIENT)
Dept: PULMONOLOGY | Facility: CLINIC | Age: 65
End: 2025-05-09
Payer: MEDICARE

## 2025-05-09 NOTE — LETTER
Manuela Johnson  [address] [facility] [address]    May 9, 2025     Dear Ms. Johnson:    Below are the results from your recent visit:    Low dose CT Chest was reviewed.  Previously noted pulmonary nodule is noted to be stable.  We will plan to repeat CT Chest in one year.    If you have any questions or concerns, please don't hesitate to call.         Sincerely,        SAUL Amato

## (undated) DEVICE — SUT SILK 2/0 FS 18IN 685H

## (undated) DEVICE — ENDOGATOR AUXILIARY WATER JET CONNECTOR: Brand: ENDOGATOR

## (undated) DEVICE — ENDOPATH ELECTROSURGERY PROBE PLUS II 5MM RIGHT ANGLE MONOPOLAR ELECTROSURGERY SUCTION AND IRRRIGATION SHAFTS WITH RIGHT ANGLE ELECTRODE - USE ONLY WITH PROBE PLUS II HANDLES: Brand: ENDOPATH

## (undated) DEVICE — TROCAR: Brand: KII® SLEEVE

## (undated) DEVICE — SUCTION CANISTER, 1500CC, RIGID: Brand: DEROYAL

## (undated) DEVICE — HOLDER: Brand: DEROYAL

## (undated) DEVICE — BNDG ADHS CURAD FLX/FABRC 2X4IN STRL LF

## (undated) DEVICE — TROCAR: Brand: KII FIOS FIRST ENTRY

## (undated) DEVICE — Device

## (undated) DEVICE — ENCORE® LATEX MICRO SIZE 7.5, STERILE LATEX POWDER-FREE SURGICAL GLOVE: Brand: ENCORE

## (undated) DEVICE — ENDOPATH XCEL BLADELESS TROCARS WITH STABILITY SLEEVES: Brand: ENDOPATH XCEL

## (undated) DEVICE — 3M™ STERI-STRIP™ REINFORCED ADHESIVE SKIN CLOSURES, R1547, 1/2 IN X 4 IN (12 MM X 100 MM), 6 STRIPS/ENVELOPE: Brand: 3M™ STERI-STRIP™

## (undated) DEVICE — CYSTO/BLADDER IRRIGATION SET, REGULATING CLAMP

## (undated) DEVICE — ENDOPATH ELECTROSURGERY PROBE PLUS II PISTOL HAND CONTROL PISTOL GRIP HANDLES WITH SUCTION AND IRRRIGATION FOR HAND CONTROL MONOPOLAR ELCTROSURGERY USE ONLY WITH PROBE PLUS II SHAFTS: Brand: ENDOPATH

## (undated) DEVICE — TUBING, SUCTION, 3/16" X 10', STRAIGHT: Brand: MEDLINE

## (undated) DEVICE — GOWN,REINF,POLY,ECL,PP SLV,XL: Brand: MEDLINE

## (undated) DEVICE — PK EXTREM UPPR 70

## (undated) DEVICE — COR LAP CHOLE: Brand: MEDLINE INDUSTRIES, INC.

## (undated) DEVICE — DRSNG PAD ABD 8X10IN STRL

## (undated) DEVICE — TRY SKINPREP PVP SCRB W PAINT

## (undated) DEVICE — CONN Y IRR DISP 1P/U

## (undated) DEVICE — SINGLE PORT MANIFOLD: Brand: NEPTUNE 2

## (undated) DEVICE — PETROLATUM GAUZE CISION DRESSING,OVERWRAP: Brand: VASELINE

## (undated) DEVICE — GAUZE,PACKING STRIP,PLAIN,1/2"X5YD,STRL: Brand: CURAD

## (undated) DEVICE — SYR LUERLOK 30CC

## (undated) DEVICE — ANTIBACTERIAL UNDYED BRAIDED (POLYGLACTIN 910), SYNTHETIC ABSORBABLE SUTURE: Brand: COATED VICRYL

## (undated) DEVICE — SUT VIC 2/0 UR6 27IN J602H

## (undated) DEVICE — [HIGH FLOW INSUFFLATOR,  DO NOT USE IF PACKAGE IS DAMAGED,  KEEP DRY,  KEEP AWAY FROM SUNLIGHT,  PROTECT FROM HEAT AND RADIOACTIVE SOURCES.]: Brand: PNEUMOSURE

## (undated) DEVICE — APPL CHLORAPREP W/TINT 26ML ORNG

## (undated) DEVICE — PAD GRND REM POLYHESIVE A/ DISP

## (undated) DEVICE — ENDOCUT SCISSOR TIP, DISPOSABLE: Brand: RENEW